# Patient Record
Sex: FEMALE | Race: WHITE | NOT HISPANIC OR LATINO | Employment: OTHER | URBAN - METROPOLITAN AREA
[De-identification: names, ages, dates, MRNs, and addresses within clinical notes are randomized per-mention and may not be internally consistent; named-entity substitution may affect disease eponyms.]

---

## 2023-04-05 PROBLEM — R35.0 INCREASED URINARY FREQUENCY: Status: ACTIVE | Noted: 2023-04-05

## 2023-04-05 PROBLEM — R92.8 ABNORMAL MAMMOGRAM OF RIGHT BREAST: Status: ACTIVE | Noted: 2023-04-05

## 2023-04-05 PROBLEM — I10 ESSENTIAL HYPERTENSION: Status: ACTIVE | Noted: 2023-04-05

## 2023-04-05 PROBLEM — I25.2 HISTORY OF MYOCARDIAL INFARCTION: Status: ACTIVE | Noted: 2023-04-05

## 2023-04-05 PROBLEM — M25.469 EFFUSION OF JOINT, LOWER LEG: Status: ACTIVE | Noted: 2023-04-05

## 2023-04-05 PROBLEM — R73.9 HYPERGLYCEMIA: Status: ACTIVE | Noted: 2023-04-05

## 2023-04-05 PROBLEM — Z86.39 HISTORY OF MIXED HYPERLIPIDEMIA: Status: ACTIVE | Noted: 2023-04-05

## 2023-04-05 PROBLEM — N39.0 URINARY TRACT INFECTION, ACUTE: Status: ACTIVE | Noted: 2023-04-05

## 2023-04-05 PROBLEM — F41.9 ANXIETY: Status: ACTIVE | Noted: 2023-04-05

## 2023-04-05 PROBLEM — S83.249A ACUTE MEDIAL MENISCAL TEAR: Status: ACTIVE | Noted: 2023-04-05

## 2023-04-05 PROBLEM — K21.9 GERD (GASTROESOPHAGEAL REFLUX DISEASE): Status: ACTIVE | Noted: 2023-04-05

## 2023-04-05 PROBLEM — R06.00 DYSPNEA: Status: ACTIVE | Noted: 2023-04-05

## 2023-04-05 PROBLEM — N63.11 BREAST LUMP ON RIGHT SIDE AT 10 O'CLOCK POSITION: Status: ACTIVE | Noted: 2023-04-05

## 2023-04-05 PROBLEM — M23.90 INTERNAL DERANGEMENT OF KNEE: Status: ACTIVE | Noted: 2023-04-05

## 2023-04-05 PROBLEM — N64.59 ABNORMAL BREAST FINDING: Status: ACTIVE | Noted: 2023-04-05

## 2023-04-05 PROBLEM — Z95.5 HISTORY OF HEART ARTERY STENT: Status: ACTIVE | Noted: 2023-04-05

## 2023-04-05 PROBLEM — I25.10 CORONARY ARTERIOSCLEROSIS: Status: ACTIVE | Noted: 2023-04-05

## 2023-04-05 PROBLEM — M25.562 LEFT KNEE PAIN: Status: ACTIVE | Noted: 2023-04-05

## 2023-04-05 PROBLEM — R22.1 LOCALIZED SWELLING, MASS AND LUMP, NECK: Status: ACTIVE | Noted: 2023-04-05

## 2023-04-05 PROBLEM — E78.5 HYPERLIPIDEMIA: Status: ACTIVE | Noted: 2023-04-05

## 2023-04-05 RX ORDER — LORAZEPAM 1 MG/1
TABLET ORAL
COMMUNITY
Start: 2014-05-08 | End: 2023-04-11 | Stop reason: SDUPTHER

## 2023-04-05 RX ORDER — ACETAMINOPHEN, DIPHENHYDRAMINE HCL, PHENYLEPHRINE HCL 325; 25; 5 MG/1; MG/1; MG/1
TABLET ORAL
COMMUNITY
Start: 2020-12-10

## 2023-04-05 RX ORDER — ATORVASTATIN CALCIUM 40 MG/1
1 TABLET, FILM COATED ORAL DAILY
COMMUNITY
Start: 2017-10-18 | End: 2024-04-25

## 2023-04-05 RX ORDER — METOPROLOL SUCCINATE 50 MG/1
1 TABLET, EXTENDED RELEASE ORAL DAILY
COMMUNITY
Start: 2015-03-30 | End: 2023-06-01

## 2023-04-11 ENCOUNTER — OFFICE VISIT (OUTPATIENT)
Dept: PRIMARY CARE | Facility: CLINIC | Age: 78
End: 2023-04-11
Payer: MEDICARE

## 2023-04-11 ENCOUNTER — LAB (OUTPATIENT)
Dept: LAB | Facility: LAB | Age: 78
End: 2023-04-11
Payer: MEDICARE

## 2023-04-11 VITALS
WEIGHT: 173 LBS | SYSTOLIC BLOOD PRESSURE: 120 MMHG | BODY MASS INDEX: 27.8 KG/M2 | DIASTOLIC BLOOD PRESSURE: 80 MMHG | HEIGHT: 66 IN

## 2023-04-11 DIAGNOSIS — F41.9 ANXIETY: ICD-10-CM

## 2023-04-11 DIAGNOSIS — Z79.899 BENZODIAZEPINE CONTRACT EXISTS: ICD-10-CM

## 2023-04-11 DIAGNOSIS — F41.9 ANXIETY: Primary | ICD-10-CM

## 2023-04-11 PROCEDURE — 80354 DRUG SCREENING FENTANYL: CPT

## 2023-04-11 PROCEDURE — 80307 DRUG TEST PRSMV CHEM ANLYZR: CPT

## 2023-04-11 PROCEDURE — 1159F MED LIST DOCD IN RCRD: CPT | Performed by: STUDENT IN AN ORGANIZED HEALTH CARE EDUCATION/TRAINING PROGRAM

## 2023-04-11 PROCEDURE — 80368 SEDATIVE HYPNOTICS: CPT

## 2023-04-11 PROCEDURE — 80365 DRUG SCREENING OXYCODONE: CPT

## 2023-04-11 PROCEDURE — 3079F DIAST BP 80-89 MM HG: CPT | Performed by: STUDENT IN AN ORGANIZED HEALTH CARE EDUCATION/TRAINING PROGRAM

## 2023-04-11 PROCEDURE — 3074F SYST BP LT 130 MM HG: CPT | Performed by: STUDENT IN AN ORGANIZED HEALTH CARE EDUCATION/TRAINING PROGRAM

## 2023-04-11 PROCEDURE — 80358 DRUG SCREENING METHADONE: CPT

## 2023-04-11 PROCEDURE — 80346 BENZODIAZEPINES1-12: CPT

## 2023-04-11 PROCEDURE — 80373 DRUG SCREENING TRAMADOL: CPT

## 2023-04-11 PROCEDURE — 99213 OFFICE O/P EST LOW 20 MIN: CPT | Performed by: STUDENT IN AN ORGANIZED HEALTH CARE EDUCATION/TRAINING PROGRAM

## 2023-04-11 PROCEDURE — 80361 OPIATES 1 OR MORE: CPT

## 2023-04-11 PROCEDURE — 1036F TOBACCO NON-USER: CPT | Performed by: STUDENT IN AN ORGANIZED HEALTH CARE EDUCATION/TRAINING PROGRAM

## 2023-04-11 RX ORDER — VIT C/E/ZN/COPPR/LUTEIN/ZEAXAN 250MG-90MG
2000 CAPSULE ORAL
COMMUNITY
Start: 2014-09-10 | End: 2023-04-11 | Stop reason: ALTCHOICE

## 2023-04-11 RX ORDER — LORAZEPAM 1 MG/1
1 TABLET ORAL EVERY 8 HOURS PRN
Qty: 270 TABLET | Refills: 0 | Status: SHIPPED | OUTPATIENT
Start: 2023-04-11 | End: 2023-07-11 | Stop reason: SDUPTHER

## 2023-04-11 ASSESSMENT — ENCOUNTER SYMPTOMS
LOSS OF SENSATION IN FEET: 0
OCCASIONAL FEELINGS OF UNSTEADINESS: 0
DYSURIA: 0
NERVOUS/ANXIOUS: 0
EYE DISCHARGE: 0
BRUISES/BLEEDS EASILY: 0
MYALGIAS: 0
SHORTNESS OF BREATH: 0
UNEXPECTED WEIGHT CHANGE: 0
ABDOMINAL PAIN: 0
DEPRESSION: 1

## 2023-04-11 NOTE — PROGRESS NOTES
"Follow up and med refill    Subjective   Patient ID: Jasmine Price is a 78 y.o. female who presents for Follow-up and Med Refill.    Med Refill  Pertinent negatives include no abdominal pain, chest pain, congestion, myalgias or rash.    presents for follow-up medication refill.  Overall has been doing well.  There are some days where the lorazepam is used twice a day and some days where he is 3 times a day.  Friend recently learned that her son had passed away in [place] which has caused an increase in anxiety    Review of Systems   Constitutional:  Negative for unexpected weight change.   HENT:  Negative for congestion.    Eyes:  Negative for discharge.   Respiratory:  Negative for shortness of breath.    Cardiovascular:  Negative for chest pain.   Gastrointestinal:  Negative for abdominal pain.   Endocrine: Negative for polyuria.   Genitourinary:  Negative for dysuria.   Musculoskeletal:  Negative for myalgias.   Skin:  Negative for rash.   Allergic/Immunologic: Negative for immunocompromised state.   Neurological:  Negative for syncope.   Hematological:  Does not bruise/bleed easily.   Psychiatric/Behavioral:  The patient is not nervous/anxious.          Objective   /80   Ht 1.676 m (5' 6\")   Wt 78.5 kg (173 lb)   BMI 27.92 kg/m²     Physical Exam  Constitutional:       General: She is not in acute distress.  Eyes:      Extraocular Movements: Extraocular movements intact.   Cardiovascular:      Rate and Rhythm: Normal rate and regular rhythm.      Heart sounds: No murmur heard.  Pulmonary:      Breath sounds: No wheezing.   Abdominal:      Palpations: Abdomen is soft.      Tenderness: There is no abdominal tenderness.   Musculoskeletal:         General: No swelling.   Skin:     Findings: No rash.   Psychiatric:         Mood and Affect: Mood normal.           Assessment/Plan       Anxiety  -Continue lorazepam 1 mg to 3 times daily as needed, at times will take twice a day, has been going through a " state since the passing away of her  at the beginning of 2023.  Did discuss weaning back to twice a day in the future  -Discussed side effect profile states that she is not experiencing any  -OARRS reviewed, no concerning behavior  -Drug contract 4/11/2023, urine screen ordered     CAD/DLD/HTN  -Follows with cardiology   -Continue aspirin and statin  -Continue metoprolol     Bladder prolapse  -Follows with gynecology     Health maintenance  -Signed DNAR-[facility] paperwork, has healthcare power of  designated   -Has received both Covid vaccines and booster  -Mammogram 2021 through [facility], new requisition given today  -Pneumovax 2014  -Received Shingrix  -Tdap 2021      RTC 3 months     This note was dictated by speech recognition. Minor errors in transcription may be present.

## 2023-04-14 LAB
6-ACETYLMORPHINE: <25 NG/ML
7-AMINOCLONAZEPAM: <25 NG/ML
ALPHA-HYDROXYALPRAZOLAM: <25 NG/ML
ALPHA-HYDROXYMIDAZOLAM: <25 NG/ML
ALPRAZOLAM: <25 NG/ML
AMPHETAMINE (PRESENCE) IN URINE BY SCREEN METHOD: ABNORMAL
BARBITURATES PRESENCE IN URINE BY SCREEN METHOD: ABNORMAL
CANNABINOIDS IN URINE BY SCREEN METHOD: ABNORMAL
CHLORDIAZEPOXIDE: <25 NG/ML
CLONAZEPAM: <25 NG/ML
COCAINE (PRESENCE) IN URINE BY SCREEN METHOD: ABNORMAL
CODEINE: <50 NG/ML
CREATINE, URINE FOR DRUG: 51.9 MG/DL
DIAZEPAM: <25 NG/ML
DRUG SCREEN COMMENT URINE: ABNORMAL
EDDP: <25 NG/ML
FENTANYL CONFIRMATION, URINE: <2.5 NG/ML
HYDROCODONE: <25 NG/ML
HYDROMORPHONE: <25 NG/ML
LORAZEPAM: >1000 NG/ML
METHADONE CONFIRMATION,URINE: <25 NG/ML
MIDAZOLAM: <25 NG/ML
MORPHINE URINE: <50 NG/ML
NORDIAZEPAM: <25 NG/ML
NORFENTANYL: <2.5 NG/ML
NORHYDROCODONE: <25 NG/ML
NOROXYCODONE: <25 NG/ML
O-DESMETHYLTRAMADOL: <50 NG/ML
OXAZEPAM: <25 NG/ML
OXYCODONE: <25 NG/ML
OXYMORPHONE: <25 NG/ML
PHENCYCLIDINE (PRESENCE) IN URINE BY SCREEN METHOD: ABNORMAL
TEMAZEPAM: <25 NG/ML
TRAMADOL: <50 NG/ML
ZOLPIDEM METABOLITE (ZCA): <25 NG/ML
ZOLPIDEM: <25 NG/ML

## 2023-06-01 DIAGNOSIS — I10 ESSENTIAL (PRIMARY) HYPERTENSION: ICD-10-CM

## 2023-06-01 RX ORDER — METOPROLOL SUCCINATE 50 MG/1
TABLET, EXTENDED RELEASE ORAL
Qty: 90 TABLET | Refills: 0 | Status: SHIPPED | OUTPATIENT
Start: 2023-06-01 | End: 2023-08-28

## 2023-07-10 PROBLEM — M77.40 METATARSALGIA: Status: ACTIVE | Noted: 2023-07-10

## 2023-07-10 PROBLEM — M25.569 KNEE PAIN: Status: ACTIVE | Noted: 2023-07-10

## 2023-07-10 PROBLEM — M84.376A STRESS REACTION OF FOOT: Status: ACTIVE | Noted: 2023-07-10

## 2023-07-10 PROBLEM — E28.39 MENOPAUSE OVARIAN FAILURE: Status: ACTIVE | Noted: 2023-07-10

## 2023-07-10 PROBLEM — M79.672 LEFT FOOT PAIN: Status: ACTIVE | Noted: 2023-07-10

## 2023-07-11 ENCOUNTER — OFFICE VISIT (OUTPATIENT)
Dept: PRIMARY CARE | Facility: CLINIC | Age: 78
End: 2023-07-11
Payer: MEDICARE

## 2023-07-11 VITALS — BODY MASS INDEX: 27.6 KG/M2 | DIASTOLIC BLOOD PRESSURE: 84 MMHG | SYSTOLIC BLOOD PRESSURE: 122 MMHG | WEIGHT: 171 LBS

## 2023-07-11 DIAGNOSIS — F41.9 ANXIETY: ICD-10-CM

## 2023-07-11 PROCEDURE — 99213 OFFICE O/P EST LOW 20 MIN: CPT | Performed by: STUDENT IN AN ORGANIZED HEALTH CARE EDUCATION/TRAINING PROGRAM

## 2023-07-11 PROCEDURE — 3079F DIAST BP 80-89 MM HG: CPT | Performed by: STUDENT IN AN ORGANIZED HEALTH CARE EDUCATION/TRAINING PROGRAM

## 2023-07-11 PROCEDURE — 1036F TOBACCO NON-USER: CPT | Performed by: STUDENT IN AN ORGANIZED HEALTH CARE EDUCATION/TRAINING PROGRAM

## 2023-07-11 PROCEDURE — 1160F RVW MEDS BY RX/DR IN RCRD: CPT | Performed by: STUDENT IN AN ORGANIZED HEALTH CARE EDUCATION/TRAINING PROGRAM

## 2023-07-11 PROCEDURE — 3074F SYST BP LT 130 MM HG: CPT | Performed by: STUDENT IN AN ORGANIZED HEALTH CARE EDUCATION/TRAINING PROGRAM

## 2023-07-11 PROCEDURE — 1159F MED LIST DOCD IN RCRD: CPT | Performed by: STUDENT IN AN ORGANIZED HEALTH CARE EDUCATION/TRAINING PROGRAM

## 2023-07-11 RX ORDER — LORAZEPAM 1 MG/1
1 TABLET ORAL EVERY 8 HOURS PRN
Qty: 270 TABLET | Refills: 0 | Status: SHIPPED | OUTPATIENT
Start: 2023-07-11 | End: 2023-10-10 | Stop reason: SDUPTHER

## 2023-07-11 RX ORDER — VIT C/E/ZN/COPPR/LUTEIN/ZEAXAN 250MG-90MG
2000 CAPSULE ORAL
COMMUNITY
Start: 2014-09-10 | End: 2023-07-11 | Stop reason: ALTCHOICE

## 2023-07-11 NOTE — PROGRESS NOTES
Follow up and med refill    Subjective   Patient ID: Jasmine Prcie is a 78 y.o. female who presents for Follow-up and Med Refill.    HPI presents for follow-up medication refill.  Reports has been doing well    Review of Systems  8 point review of systems is otherwise negative unless mentioned on HPI      Objective   /84   Wt 77.6 kg (171 lb)   BMI 27.60 kg/m²     Physical Exam  General: No acute distress  HEENT: EOMI  CV: Regular rate and rhythm, normal S1 and S2, no murmurs  Pulm: Clear to auscultation bilaterally, no wheezings, rales or rhonchi  Abd: Nondistended  MSK: 5/5 strength in all extremities  Skin: No rashes   Lymphatic: No lymphadenopathy      Assessment/Plan       Anxiety  -Continue lorazepam 1 mg to 3 times daily as needed, at times will take twice a day, has been going through a state since the passing away of her  at the beginning of 2023.  Did discuss weaning back to twice a day in the future  -Discussed side effect profile states that she is not experiencing any  -OARRS reviewed, no concerning behavior  -Drug contract and urine screen 4/11/2023     CAD/DLD/HTN  -Follows with cardiology   -Continue aspirin and statin  -Continue metoprolol  -Stress test 11/2022 that was normal      Bladder prolapse  -Follows with gynecology     Health maintenance  -Signed DNAR-[facility] paperwork, has healthcare power of  designated   -Has received both Covid vaccines and booster  -Mammogram 2021 through [facility], new requisition given today  -Pneumovax 2014  -Received Shingrix  -Tdap 2021      RTC 3 months     This note was dictated by speech recognition. Minor errors in transcription may be present.

## 2023-08-28 DIAGNOSIS — I10 ESSENTIAL (PRIMARY) HYPERTENSION: ICD-10-CM

## 2023-08-28 RX ORDER — METOPROLOL SUCCINATE 50 MG/1
TABLET, EXTENDED RELEASE ORAL
Qty: 90 TABLET | Refills: 0 | Status: SHIPPED | OUTPATIENT
Start: 2023-08-28 | End: 2023-11-21

## 2023-10-10 ENCOUNTER — OFFICE VISIT (OUTPATIENT)
Dept: PRIMARY CARE | Facility: CLINIC | Age: 78
End: 2023-10-10
Payer: MEDICARE

## 2023-10-10 VITALS — DIASTOLIC BLOOD PRESSURE: 90 MMHG | BODY MASS INDEX: 27.28 KG/M2 | SYSTOLIC BLOOD PRESSURE: 122 MMHG | WEIGHT: 169 LBS

## 2023-10-10 DIAGNOSIS — F41.9 ANXIETY: ICD-10-CM

## 2023-10-10 PROBLEM — I25.10 CORONARY ARTERIOSCLEROSIS: Status: RESOLVED | Noted: 2023-04-05 | Resolved: 2023-10-10

## 2023-10-10 PROCEDURE — 1160F RVW MEDS BY RX/DR IN RCRD: CPT | Performed by: STUDENT IN AN ORGANIZED HEALTH CARE EDUCATION/TRAINING PROGRAM

## 2023-10-10 PROCEDURE — 1036F TOBACCO NON-USER: CPT | Performed by: STUDENT IN AN ORGANIZED HEALTH CARE EDUCATION/TRAINING PROGRAM

## 2023-10-10 PROCEDURE — 3074F SYST BP LT 130 MM HG: CPT | Performed by: STUDENT IN AN ORGANIZED HEALTH CARE EDUCATION/TRAINING PROGRAM

## 2023-10-10 PROCEDURE — 3080F DIAST BP >= 90 MM HG: CPT | Performed by: STUDENT IN AN ORGANIZED HEALTH CARE EDUCATION/TRAINING PROGRAM

## 2023-10-10 PROCEDURE — 99213 OFFICE O/P EST LOW 20 MIN: CPT | Performed by: STUDENT IN AN ORGANIZED HEALTH CARE EDUCATION/TRAINING PROGRAM

## 2023-10-10 PROCEDURE — 1159F MED LIST DOCD IN RCRD: CPT | Performed by: STUDENT IN AN ORGANIZED HEALTH CARE EDUCATION/TRAINING PROGRAM

## 2023-10-10 RX ORDER — LORAZEPAM 1 MG/1
1 TABLET ORAL EVERY 8 HOURS PRN
Qty: 270 TABLET | Refills: 0 | Status: SHIPPED | OUTPATIENT
Start: 2023-10-10 | End: 2024-01-16 | Stop reason: SDUPTHER

## 2023-10-10 NOTE — PROGRESS NOTES
Follow up and med refill    Subjective   Patient ID: Jasmine Price is a 78 y.o. female who presents for Follow-up and Med Refill.    HPI     Presents for follow-up medication refill.  Reports has been doing well.  Occasionally has some shortness of breath with exertion.  Has not changed recently.  Had gone through a recent increase in stress with the family number that had lost their  job.    Review of Systems    8 point review of systems is otherwise negative unless mentioned on HPI      Objective   /90   Wt 76.7 kg (169 lb)   BMI 27.28 kg/m²     Physical Exam    General: No acute distress  HEENT: EOMI  CV: Regular rate and rhythm, normal S1 and S2, no murmurs  Pulm: Clear to auscultation bilaterally, no wheezings, rales or rhonchi  Abd: Nondistended  MSK: 5/5 strength in all extremities  Skin: No rashes   Lymphatic: No lymphadenopathy      Assessment/Plan       Anxiety  -Continue lorazepam 1 mg to 3 times daily as needed, at times will take twice a day, has been going through a state since the passing away of her  at the beginning of 2023.  Did discuss weaning back to twice a day in the future  -Discussed side effect profile states that she is not experiencing any  -OARRS reviewed, no concerning behavior  -Drug contract and urine screen 4/11/2023     CAD/DLD/HTN  -Follows with cardiology   -Continue aspirin and statin  -Continue metoprolol  -Stress test 11/2022 that was normal      Bladder prolapse  -Follows with gynecology     Health maintenance  -Signed [facility] paperwork, has healthcare power of  designated   -Has received both Covid vaccines and booster  -Mammogram 9/2023  -Pneumovax 2014  -Received Shingrix  -Tdap 2021      RTC 3 months     This note was dictated by speech recognition. Minor errors in transcription may be present.        Patient was identified as a fall risk. Risk prevention instructions provided.

## 2023-10-10 NOTE — PATIENT INSTRUCTIONS

## 2023-11-21 DIAGNOSIS — I10 ESSENTIAL (PRIMARY) HYPERTENSION: ICD-10-CM

## 2023-11-21 RX ORDER — METOPROLOL SUCCINATE 50 MG/1
TABLET, EXTENDED RELEASE ORAL
Qty: 90 TABLET | Refills: 0 | Status: SHIPPED | OUTPATIENT
Start: 2023-11-21 | End: 2024-02-14

## 2024-01-16 ENCOUNTER — OFFICE VISIT (OUTPATIENT)
Dept: PRIMARY CARE | Facility: CLINIC | Age: 79
End: 2024-01-16
Payer: MEDICARE

## 2024-01-16 VITALS — WEIGHT: 175 LBS | DIASTOLIC BLOOD PRESSURE: 80 MMHG | SYSTOLIC BLOOD PRESSURE: 120 MMHG | BODY MASS INDEX: 28.25 KG/M2

## 2024-01-16 DIAGNOSIS — Z00.00 MEDICARE ANNUAL WELLNESS VISIT, SUBSEQUENT: ICD-10-CM

## 2024-01-16 DIAGNOSIS — Z00.00 ROUTINE GENERAL MEDICAL EXAMINATION AT HEALTH CARE FACILITY: ICD-10-CM

## 2024-01-16 DIAGNOSIS — Z12.12 SCREENING FOR COLORECTAL CANCER: ICD-10-CM

## 2024-01-16 DIAGNOSIS — F41.9 ANXIETY: ICD-10-CM

## 2024-01-16 DIAGNOSIS — Z00.00 HEALTHCARE MAINTENANCE: Primary | ICD-10-CM

## 2024-01-16 DIAGNOSIS — Z12.11 SCREENING FOR COLORECTAL CANCER: ICD-10-CM

## 2024-01-16 PROCEDURE — 1036F TOBACCO NON-USER: CPT | Performed by: STUDENT IN AN ORGANIZED HEALTH CARE EDUCATION/TRAINING PROGRAM

## 2024-01-16 PROCEDURE — 3079F DIAST BP 80-89 MM HG: CPT | Performed by: STUDENT IN AN ORGANIZED HEALTH CARE EDUCATION/TRAINING PROGRAM

## 2024-01-16 PROCEDURE — 1170F FXNL STATUS ASSESSED: CPT | Performed by: STUDENT IN AN ORGANIZED HEALTH CARE EDUCATION/TRAINING PROGRAM

## 2024-01-16 PROCEDURE — 99213 OFFICE O/P EST LOW 20 MIN: CPT | Performed by: STUDENT IN AN ORGANIZED HEALTH CARE EDUCATION/TRAINING PROGRAM

## 2024-01-16 PROCEDURE — 1159F MED LIST DOCD IN RCRD: CPT | Performed by: STUDENT IN AN ORGANIZED HEALTH CARE EDUCATION/TRAINING PROGRAM

## 2024-01-16 PROCEDURE — 1160F RVW MEDS BY RX/DR IN RCRD: CPT | Performed by: STUDENT IN AN ORGANIZED HEALTH CARE EDUCATION/TRAINING PROGRAM

## 2024-01-16 PROCEDURE — 3074F SYST BP LT 130 MM HG: CPT | Performed by: STUDENT IN AN ORGANIZED HEALTH CARE EDUCATION/TRAINING PROGRAM

## 2024-01-16 PROCEDURE — G0439 PPPS, SUBSEQ VISIT: HCPCS | Performed by: STUDENT IN AN ORGANIZED HEALTH CARE EDUCATION/TRAINING PROGRAM

## 2024-01-16 RX ORDER — LORAZEPAM 1 MG/1
1 TABLET ORAL EVERY 8 HOURS PRN
Qty: 270 TABLET | Refills: 0 | Status: SHIPPED | OUTPATIENT
Start: 2024-01-16 | End: 2024-04-09 | Stop reason: SDUPTHER

## 2024-01-16 ASSESSMENT — PATIENT HEALTH QUESTIONNAIRE - PHQ9
SUM OF ALL RESPONSES TO PHQ9 QUESTIONS 1 AND 2: 0
1. LITTLE INTEREST OR PLEASURE IN DOING THINGS: NOT AT ALL
2. FEELING DOWN, DEPRESSED OR HOPELESS: NOT AT ALL

## 2024-01-16 ASSESSMENT — ACTIVITIES OF DAILY LIVING (ADL)
MANAGING_FINANCES: INDEPENDENT
DRESSING: INDEPENDENT
DOING_HOUSEWORK: INDEPENDENT
BATHING: INDEPENDENT
TAKING_MEDICATION: INDEPENDENT
GROCERY_SHOPPING: INDEPENDENT

## 2024-01-16 NOTE — PROGRESS NOTES
Subjective   Reason for Visit: Jasmine Price is an 78 y.o. female here for a Medicare Wellness visit.          Reviewed all medications by prescribing practitioner or clinical pharmacist (such as prescriptions, OTCs, herbal therapies and supplements) and documented in the medical record.    HPI    Patient Care Team:  Darryl Stout MD as PCP - General  Fili White DO as PCP - [employer]age PCP     Review of Systems    Objective   Vitals:  /80   Wt 79.4 kg (175 lb)   BMI 28.25 kg/m²       Physical Exam    Assessment/Plan   Problem List Items Addressed This Visit        Mental Health    Anxiety    Relevant Medications    LORazepam (Ativan) 1 mg tablet   Other Visit Diagnoses     Healthcare maintenance    -  Primary    Relevant Orders    CBC    Comprehensive Metabolic Panel    Lipid Panel    TSH with reflex to Free T4 if abnormal    Screening for colorectal cancer        Relevant Orders    CBC    Routine general medical examination at health care facility                  intact

## 2024-01-16 NOTE — PROGRESS NOTES
Subjective   Patient ID: Jasmine Price is a 78 y.o. female who presents for Follow-up, Med Refill, and Medicare Annual Wellness Visit Subsequent.  HPI  Pt is here for follow up and medication refill. She reports her shortness of breath on exertion has improved and feels well otherwise. Denies any new complaints.    Review of Systems  12-point ROS was reviewed and is negative, unless otherwise noted in HPI    Objective   Vitals:    01/16/24 1142   BP: 120/80      Physical Exam  GEN: alert, conversant, NAD  HEENT: PERRL, EOMI, MMM  NECK: supple, no LAD appreciated  CHEST: CTAB  CV: S1, S2, RRR, no murmurs appreciated  ABD: soft, NT, ND  EXT: no significant LE edema  SKIN: warm, dry    Assessment/Plan   Anxiety  -Continue lorazepam 1 mg to 3 times daily as needed, at times will take twice a day. Did discuss weaning back to twice a day in the future  -Discussed side effect profile states that she is not experiencing any  -OARRS reviewed, no concerning behavior  -Drug contract and urine screen 4/11/2023     CAD/DLD/HTN  -Follows with cardiology   -Continue aspirin and statin  -Continue metoprolol  -Stress test 11/2022 that was normal      Bladder prolapse  -Follows with gynecology     Health maintenance  -Signed DNAR-[facility] paperwork, has healthcare power of  designated   -Has received both Covid vaccines and booster  -Mammogram 9/2023  -Pneumovax 2014  -Received Shingrix  -Tdap 2021   -Labs ordered today  -Annual wellness today     RTC 3 months     This note was dictated by speech recognition. Minor errors in transcription may be present.       Lex Tovar,

## 2024-01-16 NOTE — PROGRESS NOTES
Follow up and med refill and wellness visit    Subjective   Reason for Visit: Jasmine Price is an 78 y.o. female here for a Medicare Wellness visit.          Reviewed all medications by prescribing practitioner or clinical pharmacist (such as prescriptions, OTCs, herbal therapies and supplements) and documented in the medical record.    HPI    Patient Care Team:  Darryl Stout MD as PCP - General  Fili White DO as PCP - [employer]vantage PCP     Review of Systems    Objective   Vitals:  /80   Wt 79.4 kg (175 lb)   BMI 28.25 kg/m²       Physical Exam    Assessment/Plan   Problem List Items Addressed This Visit       Anxiety

## 2024-01-25 ENCOUNTER — LAB (OUTPATIENT)
Dept: LAB | Facility: LAB | Age: 79
End: 2024-01-25
Payer: MEDICARE

## 2024-01-25 DIAGNOSIS — Z00.00 HEALTHCARE MAINTENANCE: ICD-10-CM

## 2024-01-25 DIAGNOSIS — Z12.11 SCREENING FOR COLORECTAL CANCER: ICD-10-CM

## 2024-01-25 DIAGNOSIS — Z12.12 SCREENING FOR COLORECTAL CANCER: ICD-10-CM

## 2024-01-25 LAB
ALBUMIN SERPL BCP-MCNC: 4 G/DL (ref 3.4–5)
ALP SERPL-CCNC: 62 U/L (ref 33–136)
ALT SERPL W P-5'-P-CCNC: 13 U/L (ref 7–45)
ANION GAP SERPL CALC-SCNC: 11 MMOL/L (ref 10–20)
AST SERPL W P-5'-P-CCNC: 18 U/L (ref 9–39)
BILIRUB SERPL-MCNC: 0.6 MG/DL (ref 0–1.2)
BUN SERPL-MCNC: 23 MG/DL (ref 6–23)
CALCIUM SERPL-MCNC: 8.7 MG/DL (ref 8.6–10.3)
CHLORIDE SERPL-SCNC: 105 MMOL/L (ref 98–107)
CHOLEST SERPL-MCNC: 128 MG/DL (ref 0–199)
CHOLESTEROL/HDL RATIO: 2.7
CO2 SERPL-SCNC: 28 MMOL/L (ref 21–32)
CREAT SERPL-MCNC: 0.83 MG/DL (ref 0.5–1.05)
EGFRCR SERPLBLD CKD-EPI 2021: 72 ML/MIN/1.73M*2
ERYTHROCYTE [DISTWIDTH] IN BLOOD BY AUTOMATED COUNT: 13.4 % (ref 11.5–14.5)
GLUCOSE SERPL-MCNC: 107 MG/DL (ref 74–99)
HCT VFR BLD AUTO: 44 % (ref 36–46)
HDLC SERPL-MCNC: 47 MG/DL
HGB BLD-MCNC: 13.9 G/DL (ref 12–16)
LDLC SERPL CALC-MCNC: 50 MG/DL
MCH RBC QN AUTO: 30.8 PG (ref 26–34)
MCHC RBC AUTO-ENTMCNC: 31.6 G/DL (ref 32–36)
MCV RBC AUTO: 98 FL (ref 80–100)
NON HDL CHOLESTEROL: 81 MG/DL (ref 0–149)
NRBC BLD-RTO: 0 /100 WBCS (ref 0–0)
PLATELET # BLD AUTO: 224 X10*3/UL (ref 150–450)
POTASSIUM SERPL-SCNC: 4.7 MMOL/L (ref 3.5–5.3)
PROT SERPL-MCNC: 6.9 G/DL (ref 6.4–8.2)
RBC # BLD AUTO: 4.51 X10*6/UL (ref 4–5.2)
SODIUM SERPL-SCNC: 139 MMOL/L (ref 136–145)
TRIGL SERPL-MCNC: 154 MG/DL (ref 0–149)
TSH SERPL-ACNC: 2.77 MIU/L (ref 0.44–3.98)
VLDL: 31 MG/DL (ref 0–40)
WBC # BLD AUTO: 7.3 X10*3/UL (ref 4.4–11.3)

## 2024-01-25 PROCEDURE — 36415 COLL VENOUS BLD VENIPUNCTURE: CPT

## 2024-01-25 PROCEDURE — 84443 ASSAY THYROID STIM HORMONE: CPT

## 2024-01-25 PROCEDURE — 85027 COMPLETE CBC AUTOMATED: CPT

## 2024-01-25 PROCEDURE — 80061 LIPID PANEL: CPT

## 2024-01-25 PROCEDURE — 80053 COMPREHEN METABOLIC PANEL: CPT

## 2024-02-14 DIAGNOSIS — I10 ESSENTIAL (PRIMARY) HYPERTENSION: ICD-10-CM

## 2024-02-14 RX ORDER — METOPROLOL SUCCINATE 50 MG/1
TABLET, EXTENDED RELEASE ORAL
Qty: 90 TABLET | Refills: 1 | Status: SHIPPED | OUTPATIENT
Start: 2024-02-14

## 2024-04-04 PROBLEM — M25.40 EFFUSION OF JOINT: Status: ACTIVE | Noted: 2023-04-05

## 2024-04-04 PROBLEM — I10 PRIMARY HYPERTENSION: Status: ACTIVE | Noted: 2023-04-05

## 2024-04-04 PROBLEM — Z98.890 HISTORY OF COLONOSCOPY: Status: ACTIVE | Noted: 2024-04-04

## 2024-04-04 PROBLEM — E55.9 VITAMIN D DEFICIENCY: Status: ACTIVE | Noted: 2024-04-04

## 2024-04-04 PROBLEM — R06.02 SHORTNESS OF BREATH: Status: ACTIVE | Noted: 2023-04-05

## 2024-04-04 PROBLEM — E28.39 OVARIAN FAILURE DUE TO MENOPAUSE: Status: ACTIVE | Noted: 2023-07-10

## 2024-04-04 PROBLEM — Z86.39 HISTORY OF ELEVATED LIPIDS: Status: ACTIVE | Noted: 2023-04-05

## 2024-04-04 PROBLEM — R68.89 FINDING OF NECK REGION: Status: ACTIVE | Noted: 2023-04-05

## 2024-04-04 PROBLEM — N63.0 MASS OF BREAST: Status: ACTIVE | Noted: 2023-04-05

## 2024-04-04 PROBLEM — M25.562 PAIN IN LEFT KNEE: Status: ACTIVE | Noted: 2023-04-05

## 2024-04-04 PROBLEM — R92.8 ABNORMAL MAMMOGRAM: Status: ACTIVE | Noted: 2023-04-05

## 2024-04-04 PROBLEM — N39.0 ACUTE URINARY TRACT INFECTION: Status: ACTIVE | Noted: 2023-04-05

## 2024-04-04 PROBLEM — R35.0 INCREASED FREQUENCY OF URINATION: Status: ACTIVE | Noted: 2023-04-05

## 2024-04-04 PROBLEM — S92.909A FRACTURE OF FOOT: Status: ACTIVE | Noted: 2023-07-10

## 2024-04-04 PROBLEM — M23.90 DERANGEMENT OF KNEE: Status: ACTIVE | Noted: 2023-04-05

## 2024-04-04 PROBLEM — K21.9 GASTROESOPHAGEAL REFLUX DISEASE: Status: ACTIVE | Noted: 2023-04-05

## 2024-04-09 ENCOUNTER — OFFICE VISIT (OUTPATIENT)
Dept: PRIMARY CARE | Facility: CLINIC | Age: 79
End: 2024-04-09
Payer: MEDICARE

## 2024-04-09 VITALS
WEIGHT: 173 LBS | BODY MASS INDEX: 27.8 KG/M2 | SYSTOLIC BLOOD PRESSURE: 125 MMHG | HEIGHT: 66 IN | DIASTOLIC BLOOD PRESSURE: 70 MMHG

## 2024-04-09 DIAGNOSIS — F41.9 ANXIETY: ICD-10-CM

## 2024-04-09 PROCEDURE — 3078F DIAST BP <80 MM HG: CPT | Performed by: STUDENT IN AN ORGANIZED HEALTH CARE EDUCATION/TRAINING PROGRAM

## 2024-04-09 PROCEDURE — 3074F SYST BP LT 130 MM HG: CPT | Performed by: STUDENT IN AN ORGANIZED HEALTH CARE EDUCATION/TRAINING PROGRAM

## 2024-04-09 PROCEDURE — 1159F MED LIST DOCD IN RCRD: CPT | Performed by: STUDENT IN AN ORGANIZED HEALTH CARE EDUCATION/TRAINING PROGRAM

## 2024-04-09 PROCEDURE — 1036F TOBACCO NON-USER: CPT | Performed by: STUDENT IN AN ORGANIZED HEALTH CARE EDUCATION/TRAINING PROGRAM

## 2024-04-09 PROCEDURE — 99213 OFFICE O/P EST LOW 20 MIN: CPT | Performed by: STUDENT IN AN ORGANIZED HEALTH CARE EDUCATION/TRAINING PROGRAM

## 2024-04-09 RX ORDER — LORAZEPAM 1 MG/1
1 TABLET ORAL EVERY 8 HOURS PRN
Qty: 270 TABLET | Refills: 0 | Status: SHIPPED | OUTPATIENT
Start: 2024-04-09

## 2024-04-09 RX ORDER — PHENAZOPYRIDINE HYDROCHLORIDE 95 MG/1
95 TABLET ORAL 3 TIMES DAILY PRN
COMMUNITY

## 2024-04-09 NOTE — PROGRESS NOTES
"Subjective   Patient ID: Jasmine Price is a 79 y.o. female who presents for Follow-up (3 month follow up ), Wheezing (For about a month noticed wheezing; occasional; happens a lot of times at night when laying down; post nasal drainage also ), and Med Refill (Lorazapam ).    HPI     Presents for follow-up medication refill.  Has been doing well.  Over about the last month has noticed a little bit of wheezing at night when laying down.  Had a recent cold.  Not overly bothered by the wheezing at this time.  Attributes some of it to postnasal drip.    Review of Systems    8 point review of systems is otherwise negative unless mentioned on HPI      Objective   /70   Ht 1.676 m (5' 6\")   Wt 78.5 kg (173 lb)   BMI 27.92 kg/m²     Physical Exam    General: No acute distress  HEENT: EOMI  CV: Regular rate and rhythm, normal S1 and S2, no murmurs  Pulm: Clear to auscultation bilaterally, no wheezings, rales or rhonchi  Abd: Nondistended  MSK: 5/5 strength in all extremities  Skin: No rashes   Lymphatic: No lymphadenopathy      Assessment/Plan       Anxiety  -Continue lorazepam 1 mg to 3 times daily as needed, at times will take twice a day. Did discuss weaning back to twice a day in the future  -Discussed side effect profile states that she is not experiencing any  -OARRS reviewed, no concerning behavior  -Drug contract and urine screen 4/11/2023    Occasional wheezing at nighttime  -Discussed trial of albuterol inhaler, not overly bothersome at this time and will plan to monitor.     CAD/DLD/HTN  -Follows with cardiology   -Continue aspirin and statin  -Continue metoprolol  -Stress test 11/2022 that was normal      Bladder prolapse  -Follows with gynecology     Health maintenance  -Signed DNAR-CCA paperwork, has healthcare power of  designated   -Has received both Covid vaccines and booster  -Mammogram 9/2023  -Pneumovax 2014  -Received Shingrix  -Tdap 2021      RTC 3 months     This note was dictated by " speech recognition. Minor errors in transcription may be present.

## 2024-04-23 ENCOUNTER — HOSPITAL ENCOUNTER (OUTPATIENT)
Dept: RADIOLOGY | Facility: HOSPITAL | Age: 79
Discharge: HOME | End: 2024-04-23
Payer: MEDICARE

## 2024-04-23 ENCOUNTER — LAB (OUTPATIENT)
Dept: LAB | Facility: LAB | Age: 79
End: 2024-04-23
Payer: MEDICARE

## 2024-04-23 ENCOUNTER — OFFICE VISIT (OUTPATIENT)
Dept: CARDIOLOGY | Facility: HOSPITAL | Age: 79
End: 2024-04-23
Payer: MEDICARE

## 2024-04-23 ENCOUNTER — APPOINTMENT (OUTPATIENT)
Dept: PRIMARY CARE | Facility: CLINIC | Age: 79
End: 2024-04-23
Payer: MEDICARE

## 2024-04-23 ENCOUNTER — APPOINTMENT (OUTPATIENT)
Dept: LAB | Facility: LAB | Age: 79
End: 2024-04-23
Payer: MEDICARE

## 2024-04-23 VITALS
HEIGHT: 66 IN | BODY MASS INDEX: 27.11 KG/M2 | HEART RATE: 66 BPM | DIASTOLIC BLOOD PRESSURE: 75 MMHG | SYSTOLIC BLOOD PRESSURE: 107 MMHG | WEIGHT: 168.7 LBS

## 2024-04-23 DIAGNOSIS — R06.02 SHORTNESS OF BREATH: ICD-10-CM

## 2024-04-23 DIAGNOSIS — I10 ESSENTIAL HYPERTENSION: ICD-10-CM

## 2024-04-23 DIAGNOSIS — I25.10 CORONARY ARTERY DISEASE INVOLVING NATIVE CORONARY ARTERY OF NATIVE HEART WITHOUT ANGINA PECTORIS: Primary | ICD-10-CM

## 2024-04-23 DIAGNOSIS — E78.00 PURE HYPERCHOLESTEROLEMIA: ICD-10-CM

## 2024-04-23 DIAGNOSIS — I25.2 HISTORY OF MYOCARDIAL INFARCTION: ICD-10-CM

## 2024-04-23 LAB
ATRIAL RATE: 66 BPM
BNP SERPL-MCNC: 71 PG/ML (ref 0–99)
D DIMER PPP FEU-MCNC: 1318 NG/ML FEU
P AXIS: 70 DEGREES
P OFFSET: 216 MS
P ONSET: 154 MS
PR INTERVAL: 148 MS
Q ONSET: 228 MS
QRS COUNT: 11 BEATS
QRS DURATION: 90 MS
QT INTERVAL: 396 MS
QTC CALCULATION(BAZETT): 415 MS
QTC FREDERICIA: 409 MS
R AXIS: 47 DEGREES
T AXIS: 70 DEGREES
T OFFSET: 426 MS
VENTRICULAR RATE: 66 BPM

## 2024-04-23 PROCEDURE — 93005 ELECTROCARDIOGRAM TRACING: CPT | Performed by: INTERNAL MEDICINE

## 2024-04-23 PROCEDURE — 83880 ASSAY OF NATRIURETIC PEPTIDE: CPT

## 2024-04-23 PROCEDURE — 99214 OFFICE O/P EST MOD 30 MIN: CPT | Mod: 25 | Performed by: INTERNAL MEDICINE

## 2024-04-23 PROCEDURE — 1036F TOBACCO NON-USER: CPT | Performed by: INTERNAL MEDICINE

## 2024-04-23 PROCEDURE — 71046 X-RAY EXAM CHEST 2 VIEWS: CPT

## 2024-04-23 PROCEDURE — 99214 OFFICE O/P EST MOD 30 MIN: CPT | Performed by: INTERNAL MEDICINE

## 2024-04-23 PROCEDURE — 1159F MED LIST DOCD IN RCRD: CPT | Performed by: INTERNAL MEDICINE

## 2024-04-23 PROCEDURE — 36415 COLL VENOUS BLD VENIPUNCTURE: CPT

## 2024-04-23 PROCEDURE — 71046 X-RAY EXAM CHEST 2 VIEWS: CPT | Performed by: RADIOLOGY

## 2024-04-23 PROCEDURE — 3078F DIAST BP <80 MM HG: CPT | Performed by: INTERNAL MEDICINE

## 2024-04-23 PROCEDURE — 1160F RVW MEDS BY RX/DR IN RCRD: CPT | Performed by: INTERNAL MEDICINE

## 2024-04-23 PROCEDURE — 3074F SYST BP LT 130 MM HG: CPT | Performed by: INTERNAL MEDICINE

## 2024-04-23 PROCEDURE — 85379 FIBRIN DEGRADATION QUANT: CPT

## 2024-04-23 RX ORDER — LORATADINE 10 MG/1
10 TABLET ORAL DAILY
COMMUNITY

## 2024-04-23 ASSESSMENT — ENCOUNTER SYMPTOMS
DEPRESSION: 0
OCCASIONAL FEELINGS OF UNSTEADINESS: 0
LOSS OF SENSATION IN FEET: 0

## 2024-04-23 NOTE — PATIENT INSTRUCTIONS
Check VTE D-dimer and BNP.  Check a chest x-ray.  Decrease metoprolol succinate to 25 mg daily.  Follow-up in 3 months.

## 2024-04-23 NOTE — PROGRESS NOTES
Primary Care Physician: Darryl Stout MD  Date of Visit: 04/23/2024 11:00 AM EDT  Location of visit: [facility]     Chief Complaint:   Chief Complaint   Patient presents with    Follow-up    Shortness of Breath        HPI / Summary:   Jasmine Price is a 79 y.o. female presents for followup.  She does continue to have dyspnea on exertion when walking prolonged distances up to half a mile or when she carries things up a flight of stairs.  This has not changed significantly in the last 2 years or so.  It started during COVID when she stopped exercising and her  was sick.  She now walks up to half a mile in her neighborhood.  She will get short of breath if she has to walk up an incline or carry things up a flight of stairs.  The patient denies chest pain, palpitations, lightheadedness, syncope, orthopnea, paroxysmal nocturnal dyspnea, lower extremity edema, or bleeding problems.    She had an exercise stress test and echocardiogram in November 2022 for the same symptoms.          Past Medical History:   Diagnosis Date    Generalized anxiety disorder 04/15/2015    Generalized anxiety disorder    Other specified postprocedural states     H/O colonoscopy    Personal history of other medical treatment     H/O bone density study    Personal history of other medical treatment     History of mammogram    Vitamin D deficiency, unspecified     Vitamin D deficiency        No past surgical history on file.       Social History:   reports that she has quit smoking. Her smoking use included cigarettes. She has quit using smokeless tobacco. She reports current alcohol use. She reports that she does not use drugs.     Family History:  family history is not on file.      Allergies:  Allergies   Allergen Reactions    Iodinated Contrast Media Nausea Only, Dizziness and Unknown     Other reaction(s): Intolerance   Very Nauseated and extremely Dizzy    Patient reports this is no longer clinically significant. She has had  "contrast die since this was reported    Iodine Unknown    Nitrofurantoin Monohyd/M-Cryst Other     vomited    Bacitracin Rash       Outpatient Medications:  Current Outpatient Medications   Medication Instructions    aspirin 81 mg capsule 1 tablet, oral, Daily    atorvastatin (Lipitor) 40 mg tablet 1 tablet, oral, Daily    loratadine (CLARITIN) 10 mg, oral, Daily    LORazepam (ATIVAN) 1 mg, oral, Every 8 hours PRN    melatonin 10 mg tablet oral    metoprolol succinate XL (Toprol-XL) 50 mg 24 hr tablet TAKE 1 TABLET BY MOUTH EVERY DAY    phenazopyridine (URINARY PAIN RELIEF) 95 mg, oral, 3 times daily PRN       Physical Exam:  Vitals:    04/23/24 1105   BP: 107/75   BP Location: Left arm   Patient Position: Sitting   BP Cuff Size: Adult   Pulse: 66   Weight: 76.5 kg (168 lb 11.2 oz)   Height: 1.676 m (5' 6\")     Wt Readings from Last 5 Encounters:   04/23/24 76.5 kg (168 lb 11.2 oz)   04/09/24 78.5 kg (173 lb)   01/16/24 79.4 kg (175 lb)   10/10/23 76.7 kg (169 lb)   07/11/23 77.6 kg (171 lb)     Body mass index is 27.23 kg/m².   General: Well-developed well-nourished in no acute distress  HEENT: Normocephalic atraumatic  Neck: Supple, JVP is normal negative hepatojugular reflux 2+ carotid pulses without bruit  Pulmonary: Normal respiratory effort, clear to auscultation  Cardiovascular: No right ventricular heave, normal S1 and S2, no murmurs rubs or gallops  Abdomen: Soft nontender nondistended  Extremities: Warm without edema 2+ radial pulses bilaterally   Neurologic: Alert and oriented x3  Psychiatric: Normal mood and affect     Last Labs:  CMP:  Recent Labs     01/25/24  1020 10/27/22  0935 04/01/21  0912    142 143   K 4.7 4.5 4.9    107 105   CO2 28 31 29   ANIONGAP 11 9* 14   BUN 23 18 18   CREATININE 0.83 0.81 0.83   EGFR 72  --   --    GLUCOSE 107* 106* 109*     Recent Labs     01/25/24  1020 10/27/22  0935 04/01/21  0912   ALBUMIN 4.0 3.8 4.2   ALKPHOS 62 64 78   ALT 13 11 13   AST 18 15 17 " "  BILITOT 0.6 0.7 0.8     CBC:  Recent Labs     01/25/24  1020 10/27/22  0935 04/01/21  0912   WBC 7.3 6.8 7.5   HGB 13.9 13.1 14.3   HCT 44.0 40.7 44.3    204 222   MCV 98 95 100     COAG: No results for input(s): \"INR\", \"DDIMERVTE\" in the last 81196 hours.  HEME/ENDO:  Recent Labs     01/25/24  1020 10/27/22  0935 04/01/21  0912   TSH 2.77 2.10 3.25      CARDIAC: No results for input(s): \"LDH\", \"CKMB\", \"TROPHS\", \"BNP\" in the last 34634 hours.    No lab exists for component: \"CK\", \"CKMBP\"  Recent Labs     01/25/24  1020 10/27/22  0935 04/01/21  0912 06/19/19  0846   CHOL 128 118 141 148   LDLF  --  53 57 61   LDLCALC 50  --   --   --    HDL 47.0 44.7 50.6 57.5   TRIG 154* 100 168* 148       Last Cardiology Tests:  ECG:  An electrocardiogram performed today that I reviewed showed normal sinus rhythm and was normal.    Echo:  Echocardiogram November 3, 2022  CONCLUSIONS:   1. Left ventricular systolic function is normal with a 60-65% estimated ejection fraction.   2. Spectral Doppler shows an impaired relaxation pattern of left ventricular diastolic filling.       Cath:      Stress Test:  Exercise nuclear stress test November 3, 2022  The patient exercised to stage II on a Half Sonido protocol for 9 minutes and 00 seconds, achieving 7 METS. Patient received a total of 32.0 mCi of Myoview at 1:55:00 PM. The peak heart rate achieved was 133 bpm, which was 94 % of the age predicted target heart rate of 142 bpm.   Summary:  1. No clinical or electrocardiographic evidence for ischemia at a maximal workload.  2. Nuclear image results are reported separately.  3. The adequate level of stress was achieved.   IMPRESSION:  1.  Normal myocardial perfusion study without evidence of ischemia or  prior infarction.  2. The left ventricle is normal in size.  3. Normal LV wall motion with an LV EF estimated at greater than 65%.      Cardiac Imaging:        Assessment/Plan   Diagnoses and all orders for this visit:  Coronary " artery disease involving native coronary artery of native heart without angina pectoris  Essential hypertension  -     ECG 12 lead (Clinic Performed)  Pure hypercholesterolemia  History of myocardial infarction  Shortness of breath  -     XR chest 2 views; Future  -     B-Type Natriuretic Peptide; Future  -     D-dimer, VTE Exclusion; Future    In summary Ms. Price is a pleasant 79 year-old white female with a past medical history significant for coronary artery disease status post PCI in the setting of a myocardial infarction in 2004, hypertension, and hyperlipidemia.  She does note relatively stable persistent dyspnea on exertion.  She has no angina.  She had no evidence of ischemia on prior SPECT.  I did order a chest x-ray, BNP, and D-dimer.  I did decrease her metoprolol to 25 mg daily in case there is a component of chronotropic incompetence.  I suspect that symptoms may be related to deconditioning.  She will notify us if the symptoms worsen or persist.  She should continue her other cardiovascular medications.  We will see her back in follow-up in 3 months.      Orders:  Orders Placed This Encounter   Procedures    XR chest 2 views    B-Type Natriuretic Peptide    D-dimer, VTE Exclusion    ECG 12 lead (Clinic Performed)      Followup Appts:  Future Appointments   Date Time Provider Department Center   7/17/2024  1:15 PM Darryl Stout MD [id number] [place]   7/24/2024 10:30 AM MATTHEW Singh-CNP [id number] [place]           ____________________________________________________________  Garrett Posadas MD  [facility] [place]  [facility]

## 2024-04-24 ENCOUNTER — TELEPHONE (OUTPATIENT)
Dept: CARDIOLOGY | Facility: HOSPITAL | Age: 79
End: 2024-04-24
Payer: MEDICARE

## 2024-04-24 ENCOUNTER — HOSPITAL ENCOUNTER (OUTPATIENT)
Dept: RADIOLOGY | Facility: HOSPITAL | Age: 79
Discharge: HOME | End: 2024-04-24
Payer: MEDICARE

## 2024-04-24 DIAGNOSIS — R06.02 SHORTNESS OF BREATH: ICD-10-CM

## 2024-04-24 DIAGNOSIS — R06.02 SHORTNESS OF BREATH: Primary | ICD-10-CM

## 2024-04-24 DIAGNOSIS — R79.89 D-DIMER, ELEVATED: ICD-10-CM

## 2024-04-24 LAB
CREAT SERPL-MCNC: 0.6 MG/DL (ref 0.6–1.3)
GFR SERPL CREATININE-BSD FRML MDRD: >90 ML/MIN/1.73M*2

## 2024-04-24 PROCEDURE — 82565 ASSAY OF CREATININE: CPT

## 2024-04-24 PROCEDURE — 2550000001 HC RX 255 CONTRASTS: Performed by: INTERNAL MEDICINE

## 2024-04-24 PROCEDURE — 71275 CT ANGIOGRAPHY CHEST: CPT

## 2024-04-24 PROCEDURE — 71275 CT ANGIOGRAPHY CHEST: CPT | Performed by: RADIOLOGY

## 2024-04-24 RX ADMIN — IOHEXOL 75 ML: 350 INJECTION, SOLUTION INTRAVENOUS at 12:14

## 2024-04-24 NOTE — TELEPHONE ENCOUNTER
Patient scheduled for CT angio to rule out pulmonary embolism. Patient experienced dizziness and nausea 30 years ago when she had iodinated contrast media. She denies vomiting, rash, hives, shortness of breath, swelling of the tongue or lips, feeling of her throat closing or scratchiness of her throat. Dr. Cuauhtemoc gomez.

## 2024-04-24 NOTE — TELEPHONE ENCOUNTER
Result Communication    Resulted Orders   B-Type Natriuretic Peptide   Result Value Ref Range    BNP 71 0 - 99 pg/mL    Narrative       <100 pg/mL - Heart failure unlikely  100-299 pg/mL - Intermediate probability of acute heart                  failure exacerbation. Correlate with clinical                  context and patient history.    >=300 pg/mL - Heart Failure likely. Correlate with clinical                  context and patient history.    BNP testing is performed using different testing methodology at [facility] than at other [facility]. Direct result comparisons should only be made within the same method.      D-dimer, VTE Exclusion   Result Value Ref Range    D-Dimer, Quantitative VTE Exclusion 1,318 (H) <=500 ng/mL FEU    Narrative    The VTE Exclusion D-Dimer assay is reported in ng/mL Fibrinogen Equivalent Units (FEU).    Per 's instructions for use, a value of less than 500 ng/mL (FEU) may help to exclude DVT or PE in outpatients when the assay is used with a clinical pretest probability assessment.(A[facility] must utilize and document eCalc 'Wells Score Deep Vein Thrombosis Risk' for DVT exclusion only. Emergency Department should utilize  Guidelines for Emergency Department Use of the VTE Exclusion D-Dimer and Clinical Pretest probability assessment model for DVT or PE exclusion.)       10:22 AM      Results were successfully communicated with the patient and they acknowledged their understanding.

## 2024-04-24 NOTE — TELEPHONE ENCOUNTER
----- Message from Garrett Posadas MD sent at 4/23/2024  6:36 PM EDT -----  Normal BNP. Elevated D-dimer. Recommend CT PE protocol - she denies a contrast allergy.

## 2024-04-25 DIAGNOSIS — Z86.39 HISTORY OF MIXED HYPERLIPIDEMIA: Primary | ICD-10-CM

## 2024-04-25 DIAGNOSIS — Z86.39 PERSONAL HISTORY OF OTHER ENDOCRINE, NUTRITIONAL AND METABOLIC DISEASE: ICD-10-CM

## 2024-04-25 RX ORDER — ATORVASTATIN CALCIUM 40 MG/1
40 TABLET, FILM COATED ORAL DAILY
Qty: 90 TABLET | Refills: 3 | Status: SHIPPED | OUTPATIENT
Start: 2024-04-25

## 2024-04-25 NOTE — RESULT ENCOUNTER NOTE
No evidence of pulmonary embolism.  Small hiatal hernia.  Liver cyst versus hemangioma.  Small left renal stone.  Follow-up with primary physician regarding liver and kidney findings.

## 2024-04-29 ENCOUNTER — TELEPHONE (OUTPATIENT)
Dept: CARDIOLOGY | Facility: HOSPITAL | Age: 79
End: 2024-04-29
Payer: MEDICARE

## 2024-04-29 NOTE — TELEPHONE ENCOUNTER
Spoke with patient regarding results.     She reports that decreasing metoprolol has greatly improved her SOB. She monitors her BP's which have been WNL.

## 2024-04-29 NOTE — TELEPHONE ENCOUNTER
Patient's phone goes directly to [employer]. Message left providing CT and Xray results. Patient encouraged to call with any questions or concerns.

## 2024-07-14 LAB
ATRIAL RATE: 66 BPM
P AXIS: 70 DEGREES
P OFFSET: 216 MS
P ONSET: 154 MS
PR INTERVAL: 148 MS
Q ONSET: 228 MS
QRS COUNT: 11 BEATS
QRS DURATION: 90 MS
QT INTERVAL: 396 MS
QTC CALCULATION(BAZETT): 415 MS
QTC FREDERICIA: 409 MS
R AXIS: 47 DEGREES
T AXIS: 70 DEGREES
T OFFSET: 426 MS
VENTRICULAR RATE: 66 BPM

## 2024-07-17 ENCOUNTER — APPOINTMENT (OUTPATIENT)
Dept: PRIMARY CARE | Facility: CLINIC | Age: 79
End: 2024-07-17
Payer: MEDICARE

## 2024-07-17 VITALS — BODY MASS INDEX: 27.12 KG/M2 | SYSTOLIC BLOOD PRESSURE: 122 MMHG | WEIGHT: 168 LBS | DIASTOLIC BLOOD PRESSURE: 90 MMHG

## 2024-07-17 DIAGNOSIS — F41.9 ANXIETY: ICD-10-CM

## 2024-07-17 PROCEDURE — 99213 OFFICE O/P EST LOW 20 MIN: CPT | Performed by: STUDENT IN AN ORGANIZED HEALTH CARE EDUCATION/TRAINING PROGRAM

## 2024-07-17 PROCEDURE — G2211 COMPLEX E/M VISIT ADD ON: HCPCS | Performed by: STUDENT IN AN ORGANIZED HEALTH CARE EDUCATION/TRAINING PROGRAM

## 2024-07-17 PROCEDURE — 3080F DIAST BP >= 90 MM HG: CPT | Performed by: STUDENT IN AN ORGANIZED HEALTH CARE EDUCATION/TRAINING PROGRAM

## 2024-07-17 PROCEDURE — 1036F TOBACCO NON-USER: CPT | Performed by: STUDENT IN AN ORGANIZED HEALTH CARE EDUCATION/TRAINING PROGRAM

## 2024-07-17 PROCEDURE — 3074F SYST BP LT 130 MM HG: CPT | Performed by: STUDENT IN AN ORGANIZED HEALTH CARE EDUCATION/TRAINING PROGRAM

## 2024-07-17 RX ORDER — LORAZEPAM 1 MG/1
1 TABLET ORAL EVERY 8 HOURS PRN
Qty: 270 TABLET | Refills: 0 | Status: SHIPPED | OUTPATIENT
Start: 2024-07-17

## 2024-07-17 NOTE — PROGRESS NOTES
Follow up and med refill and left ear blockage    Subjective   Patient ID: Jasmine Price is a 79 y.o. female who presents for Follow-up, left ear blockage with sounds, and Med Refill.    HPI     Presents for follow-up medication refill.  Also since recent plane trip at the end of June has been feeling like left ear is blocked.  Had started along with her flights.    Review of Systems    8 point review of systems is otherwise negative unless mentioned on HPI      Objective   /90   Wt 76.2 kg (168 lb)   BMI 27.12 kg/m²     Physical Exam    General: No acute distress  HEENT: EOMI  CV: Regular rate and rhythm, normal S1 and S2, no murmurs  Pulm: Clear to auscultation bilaterally, no wheezings, rales or rhonchi  Abd: Nondistended  MSK: 5/5 strength in all extremities  Skin: No rashes   Lymphatic: No lymphadenopathy    TM pearly bilaterally, no cerumen     Assessment/Plan       Likely eustachian tube dysfunction after flying  -To start Flonase twice a day for 1 week  -If no improvement will call and let us know will prescribe course of prednisone    Anxiety  -Continue lorazepam 1 mg to 3 times daily as needed, at times will take twice a day. Did discuss weaning back to twice a day in the future  -Discussed side effect profile states that she is not experiencing any  -OARRS reviewed, no concerning behavior  -Drug contract and urine screen 7/11/2023     Occasional wheezing at nighttime  -Discussed trial of albuterol inhaler, not overly bothersome at this time and will plan to monitor.     CAD/DLD/HTN  -Follows with cardiology   -Continue aspirin and statin  -Continue metoprolol  -Stress test 11/2022 that was normal      Bladder prolapse  -Follows with gynecology     Health maintenance  -Signed DNAR-CCA paperwork, has healthcare power of  designated   -Has received both Covid vaccines and booster  -Mammogram 9/2023  -Pneumovax 2014  -Received Shingrix  -Tdap 2021      RTC 3 months     This note was dictated  by speech recognition. Minor errors in transcription may be present.

## 2024-07-22 ENCOUNTER — OFFICE VISIT (OUTPATIENT)
Dept: ORTHOPEDIC SURGERY | Facility: CLINIC | Age: 79
End: 2024-07-22
Payer: MEDICARE

## 2024-07-22 ENCOUNTER — HOSPITAL ENCOUNTER (OUTPATIENT)
Dept: RADIOLOGY | Facility: CLINIC | Age: 79
Discharge: HOME | End: 2024-07-22
Payer: MEDICARE

## 2024-07-22 VITALS — BODY MASS INDEX: 27.6 KG/M2 | WEIGHT: 171 LBS

## 2024-07-22 DIAGNOSIS — M17.10 ARTHRITIS OF KNEE: Primary | ICD-10-CM

## 2024-07-22 DIAGNOSIS — M25.562 LEFT KNEE PAIN, UNSPECIFIED CHRONICITY: ICD-10-CM

## 2024-07-22 PROCEDURE — 2500000005 HC RX 250 GENERAL PHARMACY W/O HCPCS: Performed by: ORTHOPAEDIC SURGERY

## 2024-07-22 PROCEDURE — 73562 X-RAY EXAM OF KNEE 3: CPT | Mod: LT

## 2024-07-22 PROCEDURE — 73562 X-RAY EXAM OF KNEE 3: CPT | Mod: LEFT SIDE | Performed by: RADIOLOGY

## 2024-07-22 PROCEDURE — 2500000004 HC RX 250 GENERAL PHARMACY W/ HCPCS (ALT 636 FOR OP/ED): Performed by: ORTHOPAEDIC SURGERY

## 2024-07-22 PROCEDURE — 99213 OFFICE O/P EST LOW 20 MIN: CPT | Mod: 25 | Performed by: ORTHOPAEDIC SURGERY

## 2024-07-22 PROCEDURE — 1159F MED LIST DOCD IN RCRD: CPT | Performed by: ORTHOPAEDIC SURGERY

## 2024-07-22 PROCEDURE — 20610 DRAIN/INJ JOINT/BURSA W/O US: CPT | Mod: LT | Performed by: ORTHOPAEDIC SURGERY

## 2024-07-22 RX ORDER — LIDOCAINE HYDROCHLORIDE 20 MG/ML
2 INJECTION, SOLUTION INFILTRATION; PERINEURAL
Status: COMPLETED | OUTPATIENT
Start: 2024-07-22 | End: 2024-07-22

## 2024-07-22 RX ORDER — METHYLPREDNISOLONE ACETATE 40 MG/ML
40 INJECTION, SUSPENSION INTRA-ARTICULAR; INTRALESIONAL; INTRAMUSCULAR; SOFT TISSUE
Status: COMPLETED | OUTPATIENT
Start: 2024-07-22 | End: 2024-07-22

## 2024-07-22 ASSESSMENT — PAIN SCALES - GENERAL: PAINLEVEL_OUTOF10: 5 - MODERATE PAIN

## 2024-07-22 ASSESSMENT — PAIN - FUNCTIONAL ASSESSMENT: PAIN_FUNCTIONAL_ASSESSMENT: 0-10

## 2024-07-22 ASSESSMENT — PAIN DESCRIPTION - DESCRIPTORS: DESCRIPTORS: SHARP

## 2024-07-22 NOTE — PROGRESS NOTES
Patient was reviewed and discussed with NICKI and/or orthopedic resident.  Patient was seen and evaluated in conjunction with NICKI and/or orthopedic resident. Findings and treatment plan were discussed and approved by myself, Dr. Chavez.    Assessment: Left knee osteoarthritis.    Plan: Discussed nonoperative and operative options in detail.   Risk and benefits discussed in detail. All questions answered today.  Recovery timeline and expectations discussed in detail.  Discussed purpose of cortisone injection.  Hopefully she will get back to her baseline.  Discussed possible gel injections.  Discussed possible surgical option in the future.  After informed consent under sterile conditions the left knee was injected with a combination of  2cc 2% lidocaine and 40mg Methylprednisolone. Risks and benefits were discussed in detail.  Patient ID: Jasmine Price is a 79 y.o. female.    L Inj/Asp: L knee on 7/22/2024 4:55 PM  Indications: pain, joint swelling and diagnostic evaluation  Details: 21 G needle, anterolateral approach  Medications: 40 mg methylPREDNISolone acetate 40 mg/mL; 2 mL lidocaine 20 mg/mL (2 %)  Outcome: tolerated well, no immediate complications  Procedure, treatment alternatives, risks and benefits explained, specific risks discussed. Consent was given by the patient. Immediately prior to procedure a time out was called to verify the correct patient, procedure, equipment, support staff and site/side marked as required. Patient was prepped and draped in the usual sterile fashion.

## 2024-07-22 NOTE — PROGRESS NOTES
79F here for left knee pain. Patient had a vacation at the end of June which involved a lot of walking and caused her to have ongoing diffuse left knee pain. Denies any trauma or acute events. Patient has been using ice and OTC pain medications PRN for relief with limited improvement. Of note, patient had a prior left knee arthroscopic debridement of a meniscal tear and chondroplasty years prior with Dr. Chavez. Denies DM, kidney, GI, hematologic disease.     Exam: left knee diffuse pain, no specific JLT.   Ligamentous stable.   Healed arthroscopic surgical incisions, no erythema or effusion  Full left knee ROM 0-120+    XR left knee reviewed. Mild/moderate left knee osteoarthritis, mild varus alignment.     Assessment: left knee osteoarthritis    Plan:  Discussed operative versus nonoperative treatment options and their pros/cons   Patient agreed to proceed with left knee CSI which was given using sterile technique. Patient tolerated procedure.   Follow up PRN.

## 2024-07-24 ENCOUNTER — OFFICE VISIT (OUTPATIENT)
Dept: CARDIOLOGY | Facility: HOSPITAL | Age: 79
End: 2024-07-24
Payer: MEDICARE

## 2024-07-24 VITALS
SYSTOLIC BLOOD PRESSURE: 124 MMHG | HEART RATE: 49 BPM | DIASTOLIC BLOOD PRESSURE: 70 MMHG | BODY MASS INDEX: 27.51 KG/M2 | HEIGHT: 66 IN | WEIGHT: 171.2 LBS

## 2024-07-24 DIAGNOSIS — I10 ESSENTIAL (PRIMARY) HYPERTENSION: ICD-10-CM

## 2024-07-24 DIAGNOSIS — I10 ESSENTIAL HYPERTENSION: ICD-10-CM

## 2024-07-24 DIAGNOSIS — R00.1 BRADYCARDIA: ICD-10-CM

## 2024-07-24 DIAGNOSIS — I25.2 HISTORY OF MYOCARDIAL INFARCTION: ICD-10-CM

## 2024-07-24 DIAGNOSIS — I25.10 CORONARY ARTERY DISEASE INVOLVING NATIVE CORONARY ARTERY OF NATIVE HEART WITHOUT ANGINA PECTORIS: Primary | ICD-10-CM

## 2024-07-24 DIAGNOSIS — E78.5 HYPERLIPIDEMIA, UNSPECIFIED HYPERLIPIDEMIA TYPE: ICD-10-CM

## 2024-07-24 LAB
ATRIAL RATE: 54 BPM
P AXIS: 61 DEGREES
P OFFSET: 206 MS
P ONSET: 146 MS
PR INTERVAL: 156 MS
Q ONSET: 224 MS
QRS COUNT: 9 BEATS
QRS DURATION: 90 MS
QT INTERVAL: 426 MS
QTC CALCULATION(BAZETT): 403 MS
QTC FREDERICIA: 411 MS
R AXIS: 42 DEGREES
T AXIS: 53 DEGREES
T OFFSET: 437 MS
VENTRICULAR RATE: 54 BPM

## 2024-07-24 PROCEDURE — 3074F SYST BP LT 130 MM HG: CPT | Performed by: NURSE PRACTITIONER

## 2024-07-24 PROCEDURE — 1126F AMNT PAIN NOTED NONE PRSNT: CPT | Performed by: NURSE PRACTITIONER

## 2024-07-24 PROCEDURE — 1160F RVW MEDS BY RX/DR IN RCRD: CPT | Performed by: NURSE PRACTITIONER

## 2024-07-24 PROCEDURE — 1159F MED LIST DOCD IN RCRD: CPT | Performed by: NURSE PRACTITIONER

## 2024-07-24 PROCEDURE — 99214 OFFICE O/P EST MOD 30 MIN: CPT | Performed by: NURSE PRACTITIONER

## 2024-07-24 PROCEDURE — 3078F DIAST BP <80 MM HG: CPT | Performed by: NURSE PRACTITIONER

## 2024-07-24 PROCEDURE — G2211 COMPLEX E/M VISIT ADD ON: HCPCS | Performed by: NURSE PRACTITIONER

## 2024-07-24 PROCEDURE — 1036F TOBACCO NON-USER: CPT | Performed by: NURSE PRACTITIONER

## 2024-07-24 PROCEDURE — 93005 ELECTROCARDIOGRAM TRACING: CPT | Performed by: NURSE PRACTITIONER

## 2024-07-24 RX ORDER — METOPROLOL SUCCINATE 25 MG/1
12.5 TABLET, EXTENDED RELEASE ORAL DAILY
Qty: 45 TABLET | Refills: 3 | Status: SHIPPED | OUTPATIENT
Start: 2024-07-24 | End: 2025-07-24

## 2024-07-24 ASSESSMENT — PAIN SCALES - GENERAL: PAINLEVEL: 0-NO PAIN

## 2024-07-24 NOTE — PROGRESS NOTES
Primary Care Physician: Darryl Stout MD  Date of Visit: 07/24/2024 10:30 AM EDT  Location of visit: [facility]     Chief Complaint:   Chief Complaint   Patient presents with    Follow-up     3 month        HPI / Summary:   Jasmine Price is a 79 y.o. female presents for followup. Seen in collaboration with Dr. Posadas. Her dyspnea on exertion has improved since taking lower dose of Metoprolol. She had been on vacation in [place] at end of June walking frequently without chest pain. Since this time she has not been exercising due to left knee pain.  The patient denies chest pain, palpitations, lightheadedness, syncope, orthopnea, paroxysmal nocturnal dyspnea, lower extremity edema, or bleeding problems.                Past Medical History:  Past Medical History:   Diagnosis Date    Generalized anxiety disorder 04/15/2015    Generalized anxiety disorder    Other specified postprocedural states     H/O colonoscopy    Personal history of other medical treatment     H/O bone density study    Personal history of other medical treatment     History of mammogram    Vitamin D deficiency, unspecified     Vitamin D deficiency        Past Surgical History:  No past surgical history on file.       Social History:   reports that she has quit smoking. Her smoking use included cigarettes. She has quit using smokeless tobacco. She reports current alcohol use. She reports that she does not use drugs.     Family History:  family history is not on file.      Allergies:  Allergies   Allergen Reactions    Bacitracin Rash       Outpatient Medications:  Current Outpatient Medications   Medication Instructions    aspirin 81 mg capsule 1 tablet, oral, Daily    atorvastatin (LIPITOR) 40 mg, oral, Daily    loratadine (CLARITIN) 10 mg, oral, Daily, As needed    LORazepam (ATIVAN) 1 mg, oral, Every 8 hours PRN    melatonin 10 mg tablet oral, Nightly    metoprolol succinate XL (Toprol-XL) 50 mg 24 hr tablet TAKE 1 TABLET BY MOUTH EVERY  "DAY    phenazopyridine (URINARY PAIN RELIEF) 95 mg, oral, 3 times daily PRN       Physical Exam:  Vitals:    07/24/24 1030   BP: (!) 135/44   BP Location: Right arm   Pulse: (!) 49   Weight: 77.7 kg (171 lb 3.2 oz)   Height: 1.676 m (5' 6\")     Wt Readings from Last 5 Encounters:   07/24/24 77.7 kg (171 lb 3.2 oz)   07/22/24 77.6 kg (171 lb)   07/17/24 76.2 kg (168 lb)   04/23/24 76.5 kg (168 lb 11.2 oz)   04/09/24 78.5 kg (173 lb)     Body mass index is 27.63 kg/m².   GENERAL: alert, cooperative, pleasant, in no acute distress  SKIN: warm and dry  NECK: Normal JVD, negative HJR  CARDIAC: Regular rate and rhythm with no rubs, murmurs, or gallops  CHEST: Normal respiratory efforts, lungs clear to auscultation bilaterally.  ABDOMEN: soft, nontender, nondistended  EXTREMITIES: no edema, +2 palpable RP and PT pulses bilaterally       Last Labs:  Recent Labs     01/25/24  1020 10/27/22  0935 04/01/21  0912   WBC 7.3 6.8 7.5   HGB 13.9 13.1 14.3   HCT 44.0 40.7 44.3    204 222   MCV 98 95 100     Recent Labs     01/25/24  1020 10/27/22  0935 04/01/21  0912    142 143   K 4.7 4.5 4.9    107 105   BUN 23 18 18   CREATININE 0.83 0.81 0.83     CMP -  Lab Results   Component Value Date    CALCIUM 8.7 01/25/2024    PROT 6.9 01/25/2024    ALBUMIN 4.0 01/25/2024    AST 18 01/25/2024    ALT 13 01/25/2024    ALKPHOS 62 01/25/2024    BILITOT 0.6 01/25/2024       LIPID PANEL -   Lab Results   Component Value Date    CHOL 128 01/25/2024    HDL 47.0 01/25/2024    LDLF 53 10/27/2022    TRIG 154 (H) 01/25/2024   LDL 50 1/25/24    Lab Results   Component Value Date    BNP 71 04/23/2024       Last Cardiology Tests:  ECG:  Reviewed EKG from 4/23/24- normal sinus rhythm HR 66    Echo:  Echocardiogram November 3, 2022  CONCLUSIONS:   1. Left ventricular systolic function is normal with a 60-65% estimated ejection fraction.   2. Spectral Doppler shows an impaired relaxation pattern of left ventricular diastolic filling.      "   Cath:        Stress Test:  Exercise nuclear stress test November 3, 2022  The patient exercised to stage II on a Half Sonido protocol for 9 minutes and 00 seconds, achieving 7 METS. Patient received a total of 32.0 mCi of Myoview at 1:55:00 PM. The peak heart rate achieved was 133 bpm, which was 94 % of the age predicted target heart rate of 142 bpm.   Summary:  1. No clinical or electrocardiographic evidence for ischemia at a maximal workload.  2. Nuclear image results are reported separately.  3. The adequate level of stress was achieved.   IMPRESSION:  1.  Normal myocardial perfusion study without evidence of ischemia or  prior infarction.  2. The left ventricle is normal in size.  3. Normal LV wall motion with an LV EF estimated at greater than 65%.        Cardiac Imaging:    Assessment/Plan   Problem List Items Addressed This Visit          Cardiac and Vasculature    Coronary artery disease involving native coronary artery of native heart without angina pectoris - Primary    Relevant Medications    metoprolol succinate XL (Toprol-XL) 25 mg 24 hr tablet    Essential hypertension    History of myocardial infarction    Hyperlipidemia     Other Visit Diagnoses       Bradycardia        Relevant Orders    ECG 12 lead (Clinic Performed)    Essential (primary) hypertension        Relevant Medications    metoprolol succinate XL (Toprol-XL) 25 mg 24 hr tablet          In summary Ms. Price is a pleasant 79 year-old white female with a past medical history significant for coronary artery disease status post PCI in the setting of a myocardial infarction in 2004, hypertension, and hyperlipidemia.  She does note relatively stable persistent dyspnea on exertion that has improved since Metoprolol dose was decreased.  She has no angina.  She had no evidence of ischemia on prior SPECT. Her EKG today showed sinus bradycardia HR 54. I did decrease her Metoprolol succinate to 12.5 mg once a day as I suspect she has chronotropic  incompetence given dyspnea improved on lower dose Metoprolol. She will let me know if symptoms persist or get worse.  Her blood pressure is controlled. Her recent lipid panel is at goal. I have encouraged her to increase physical activity as able when knee pain improves.  She should continue her other cardiovascular medications.  We will see her back in follow-up in 3 months.       Orders:  No orders of the defined types were placed in this encounter.     Followup Appts:  Future Appointments   Date Time Provider Department Center   10/18/2024 12:00 PM Darryl Stout MD [id number] [place]           ____________________________________________________________  Rachel Ponce, APRN-CNP  [facility]  [facility] [place]

## 2024-07-24 NOTE — PATIENT INSTRUCTIONS
Decrease Metoprolol succinate to 12.5 mg once a day ( cut your 25 mg tablet in half)  Continue all other cardiovascular medications  Follow up in 6 months  Increase physical activity as your knee allows  Please call if shortness of breath on exertion continues to persist or gets worse  Monitor blood pressure at home a couple times a week

## 2024-07-26 ENCOUNTER — TELEPHONE (OUTPATIENT)
Dept: PRIMARY CARE | Facility: CLINIC | Age: 79
End: 2024-07-26
Payer: MEDICARE

## 2024-07-26 DIAGNOSIS — H69.90 DYSFUNCTION OF EUSTACHIAN TUBE, UNSPECIFIED LATERALITY: Primary | ICD-10-CM

## 2024-07-26 RX ORDER — PREDNISONE 20 MG/1
40 TABLET ORAL DAILY
Qty: 10 TABLET | Refills: 0 | Status: SHIPPED | OUTPATIENT
Start: 2024-07-26 | End: 2024-07-31

## 2024-07-26 NOTE — TELEPHONE ENCOUNTER
Saw you last week and the nasal spray is not helping for head congestion and ear sounds. Said would order a steroid instead. Also had a steroid injection this week in knee and not sure if that would be too much steroids.

## 2024-08-09 ENCOUNTER — OFFICE VISIT (OUTPATIENT)
Dept: ORTHOPEDIC SURGERY | Facility: CLINIC | Age: 79
End: 2024-08-09
Payer: MEDICARE

## 2024-08-09 DIAGNOSIS — M17.10 ARTHRITIS OF KNEE: Primary | ICD-10-CM

## 2024-08-09 PROCEDURE — 99213 OFFICE O/P EST LOW 20 MIN: CPT | Performed by: ORTHOPAEDIC SURGERY

## 2024-08-09 PROCEDURE — 1159F MED LIST DOCD IN RCRD: CPT | Performed by: ORTHOPAEDIC SURGERY

## 2024-08-09 PROCEDURE — 1036F TOBACCO NON-USER: CPT | Performed by: ORTHOPAEDIC SURGERY

## 2024-08-09 ASSESSMENT — PAIN SCALES - GENERAL: PAINLEVEL_OUTOF10: 5 - MODERATE PAIN

## 2024-08-09 ASSESSMENT — PAIN DESCRIPTION - DESCRIPTORS: DESCRIPTORS: SHARP;ACHING

## 2024-08-09 ASSESSMENT — PAIN - FUNCTIONAL ASSESSMENT: PAIN_FUNCTIONAL_ASSESSMENT: 0-10

## 2024-08-09 NOTE — PROGRESS NOTES
No significant relief with cortisone shot.  Swelling pain along the medial knee.  Taking Advil for relief.    Exam: Tender medial joint line.  No flexion contracture.    I personally reviewed the following radiographic exams: Previous x-ray reviewed shows medial compartment sclerosis with subchondral cyst.    Assessment: Right knee arthrosis.    Plan: Will apply for gel shots.  Discussed possible knee replacement in the future.  Having daily functional pain.

## 2024-08-16 ENCOUNTER — TELEPHONE (OUTPATIENT)
Dept: CARDIOLOGY | Facility: HOSPITAL | Age: 79
End: 2024-08-16
Payer: MEDICARE

## 2024-08-16 DIAGNOSIS — I10 ESSENTIAL HYPERTENSION: Primary | ICD-10-CM

## 2024-08-16 RX ORDER — AMLODIPINE BESYLATE 5 MG/1
5 TABLET ORAL DAILY
Qty: 90 TABLET | Refills: 3 | Status: SHIPPED | OUTPATIENT
Start: 2024-08-16 | End: 2025-08-16

## 2024-08-16 NOTE — PROGRESS NOTES
Patient called with blood pressure readings in the 150s to 160s over 80s to 90s.  I recommended starting amlodipine 5 mg daily.

## 2024-08-16 NOTE — TELEPHONE ENCOUNTER
Patient called saying she was concerned about her BP. She was getting 161/92 and 152/93. She said her metoprolol was recently decreased to 12.5 mg and is concerned about the numbers she got.

## 2024-08-19 ENCOUNTER — TELEPHONE (OUTPATIENT)
Dept: PRIMARY CARE | Facility: CLINIC | Age: 79
End: 2024-08-19
Payer: MEDICARE

## 2024-08-19 DIAGNOSIS — Z12.31 ENCOUNTER FOR SCREENING MAMMOGRAM FOR MALIGNANT NEOPLASM OF BREAST: ICD-10-CM

## 2024-08-19 DIAGNOSIS — Z00.00 HEALTHCARE MAINTENANCE: Primary | ICD-10-CM

## 2024-08-20 NOTE — TELEPHONE ENCOUNTER
Per Dr. Posadas's last progress note he recommends starting amlodipine 5 mg daily. Left message for patient to call back.

## 2024-08-21 ENCOUNTER — TELEPHONE (OUTPATIENT)
Dept: ORTHOPEDIC SURGERY | Facility: CLINIC | Age: 79
End: 2024-08-21
Payer: MEDICARE

## 2024-08-21 NOTE — TELEPHONE ENCOUNTER
9/11 Patient stated that her knee feels fine and she no longer wants gel injections.     Approved for Euflexxa ,[facility] need to verify information with patient.(9/10)  Prior auth for Orthovisc submitted today via cover my meds. Waiting on approval   Key #[id number]

## 2024-08-23 DIAGNOSIS — M17.12 PRIMARY LOCALIZED OSTEOARTHRITIS OF LEFT KNEE: ICD-10-CM

## 2024-08-26 RX ORDER — HYALURONATE SODIUM 20 MG/2 ML
20 SYRINGE (ML) INTRAARTICULAR
Qty: 6 ML | Refills: 0 | Status: SHIPPED | OUTPATIENT
Start: 2024-09-01

## 2024-08-29 NOTE — TELEPHONE ENCOUNTER
She reports the following blood pressure readings after starting amlodipine 5 mg daily: 99/72, 109/69, and 129/76. She would like to know if these blood pressures are acceptable for Dr. Posadas.     Can someone call her to see how's she's feeling? Any dizziness or lightheadedness with the lower blood pressure readings?

## 2024-08-30 NOTE — TELEPHONE ENCOUNTER
Spoke to patient who said her HR has been in the 70s-80s mostly, occasionally in the 60s. No dizziness, lightheadedness, or syncope., with the lower BP readings.  She feels good. She is aware that her next appointment in in January and if she has any issues between now and then to call and we will address them either over the phone or by bringing her in if needed. Patient verbalized understanding.

## 2024-09-05 ENCOUNTER — HOSPITAL ENCOUNTER (EMERGENCY)
Facility: HOSPITAL | Age: 79
Discharge: HOME | End: 2024-09-05
Payer: MEDICARE

## 2024-09-05 ENCOUNTER — APPOINTMENT (OUTPATIENT)
Dept: RADIOLOGY | Facility: HOSPITAL | Age: 79
End: 2024-09-05
Payer: MEDICARE

## 2024-09-05 VITALS
SYSTOLIC BLOOD PRESSURE: 130 MMHG | HEART RATE: 68 BPM | BODY MASS INDEX: 26.95 KG/M2 | DIASTOLIC BLOOD PRESSURE: 80 MMHG | RESPIRATION RATE: 18 BRPM | TEMPERATURE: 98.2 F | OXYGEN SATURATION: 97 % | WEIGHT: 167 LBS

## 2024-09-05 DIAGNOSIS — M16.11 OSTEOARTHRITIS OF RIGHT HIP, UNSPECIFIED OSTEOARTHRITIS TYPE: Primary | ICD-10-CM

## 2024-09-05 DIAGNOSIS — M54.31 SCIATICA, RIGHT SIDE: ICD-10-CM

## 2024-09-05 PROCEDURE — 2500000001 HC RX 250 WO HCPCS SELF ADMINISTERED DRUGS (ALT 637 FOR MEDICARE OP): Performed by: NURSE PRACTITIONER

## 2024-09-05 PROCEDURE — 73502 X-RAY EXAM HIP UNI 2-3 VIEWS: CPT | Mod: RT

## 2024-09-05 PROCEDURE — 73502 X-RAY EXAM HIP UNI 2-3 VIEWS: CPT | Mod: RIGHT SIDE | Performed by: RADIOLOGY

## 2024-09-05 PROCEDURE — 72100 X-RAY EXAM L-S SPINE 2/3 VWS: CPT | Performed by: RADIOLOGY

## 2024-09-05 PROCEDURE — 99284 EMERGENCY DEPT VISIT MOD MDM: CPT

## 2024-09-05 PROCEDURE — 72100 X-RAY EXAM L-S SPINE 2/3 VWS: CPT

## 2024-09-05 RX ORDER — TIZANIDINE 2 MG/1
2 TABLET ORAL 2 TIMES DAILY
Qty: 20 TABLET | Refills: 0 | Status: SHIPPED | OUTPATIENT
Start: 2024-09-05 | End: 2024-09-15

## 2024-09-05 RX ORDER — OXYCODONE AND ACETAMINOPHEN 5; 325 MG/1; MG/1
1 TABLET ORAL ONCE
Status: COMPLETED | OUTPATIENT
Start: 2024-09-05 | End: 2024-09-05

## 2024-09-05 RX ORDER — DICLOFENAC SODIUM 10 MG/G
4 GEL TOPICAL 4 TIMES DAILY
Qty: 20 G | Refills: 0 | Status: SHIPPED | OUTPATIENT
Start: 2024-09-05

## 2024-09-05 RX ORDER — PREDNISONE 20 MG/1
20 TABLET ORAL DAILY
Qty: 5 TABLET | Refills: 0 | Status: SHIPPED | OUTPATIENT
Start: 2024-09-05 | End: 2024-09-10

## 2024-09-05 ASSESSMENT — COLUMBIA-SUICIDE SEVERITY RATING SCALE - C-SSRS
2. HAVE YOU ACTUALLY HAD ANY THOUGHTS OF KILLING YOURSELF?: NO
1. IN THE PAST MONTH, HAVE YOU WISHED YOU WERE DEAD OR WISHED YOU COULD GO TO SLEEP AND NOT WAKE UP?: NO
6. HAVE YOU EVER DONE ANYTHING, STARTED TO DO ANYTHING, OR PREPARED TO DO ANYTHING TO END YOUR LIFE?: NO

## 2024-09-05 ASSESSMENT — PAIN DESCRIPTION - ORIENTATION: ORIENTATION: RIGHT

## 2024-09-05 ASSESSMENT — PAIN SCALES - GENERAL: PAINLEVEL_OUTOF10: 8

## 2024-09-05 ASSESSMENT — PAIN DESCRIPTION - LOCATION: LOCATION: HIP

## 2024-09-05 NOTE — ED TRIAGE NOTES
Pt here states was pulling weeds yesterday and felt a sharp hip pain on the r. This am worse and trouble ambulating.

## 2024-09-05 NOTE — ED PROVIDER NOTES
HPI   Chief Complaint   Patient presents with    Hip Pain       79-year-old female was weeding in her backyard yesterday and she noticed acute pain in her right lower pelvic.  She thought it would resolve and she was able to sleep well last night and she felt like it got better but when she awoke this morning it is more difficult for her to ambulate due to the pain.  If she sitting in a chair and not moving the pain 0 out of 10.  If she tries to ambulate it goes to 8 out of 10.  She denies prior history of this pain.  She denies incontinence of urine or stool.  She denies paresthesia of her lower extremities.  She denies chest pain, dyspnea, abdominal pain, nausea or vomiting.  When asked if 1000 mg acetaminophen will help with her pain she indicates that that is not strong enough and would be more comfortable with the Percocet where she can go home and lie down and rest.    Her medical history includes general anxiety disorder, vitamin D deficiency, hypertension, her cardiologist Dr. Garrett Poasdas recently reduced her metoprolol and added amlodipine.  Her vital signs are normal and stable in triage.      History provided by:  Patient and relative (Daughter is bedside)          Patient History   Past Medical History:   Diagnosis Date    Generalized anxiety disorder 04/15/2015    Generalized anxiety disorder    Other specified postprocedural states     H/O colonoscopy    Personal history of other medical treatment     H/O bone density study    Personal history of other medical treatment     History of mammogram    Vitamin D deficiency, unspecified     Vitamin D deficiency     No past surgical history on file.  No family history on file.  Social History     Tobacco Use    Smoking status: Former     Types: Cigarettes    Smokeless tobacco: Former   Substance Use Topics    Alcohol use: Yes     Comment: rarely    Drug use: Never       Physical Exam   ED Triage Vitals [09/05/24 1323]   Temperature Heart Rate Respirations BP    36.8 °C (98.2 °F) 68 18 130/80      Pulse Ox Temp Source Heart Rate Source Patient Position   97 % Temporal Monitor --      BP Location FiO2 (%)     -- --       Physical Exam  Constitutional:       Appearance: Normal appearance.   HENT:      Head: Normocephalic and atraumatic.      Right Ear: Tympanic membrane normal.      Left Ear: Tympanic membrane normal.      Nose: Nose normal.      Mouth/Throat:      Mouth: Mucous membranes are moist.   Eyes:      Extraocular Movements: Extraocular movements intact.      Pupils: Pupils are equal, round, and reactive to light.   Cardiovascular:      Rate and Rhythm: Normal rate and regular rhythm.      Pulses: Normal pulses.      Heart sounds: Normal heart sounds.   Pulmonary:      Effort: Pulmonary effort is normal.      Breath sounds: Normal breath sounds.   Abdominal:      General: Abdomen is flat.      Palpations: Abdomen is soft.   Musculoskeletal:         General: Tenderness present. Normal range of motion.      Cervical back: Normal range of motion and neck supple.      Comments: Right lower hip pain and right lateral hip pain appreciated lateralized lumbar pain appreciated there was no skin rash appreciated or change in skin temperature or color.   Skin:     General: Skin is warm.      Capillary Refill: Capillary refill takes less than 2 seconds.   Neurological:      General: No focal deficit present.      Mental Status: She is alert and oriented to person, place, and time.   Psychiatric:         Mood and Affect: Mood normal.           ED Course & MDM   Diagnoses as of 09/05/24 1711   Osteoarthritis of right hip, unspecified osteoarthritis type   Sciatica, right side                 No data recorded                                 Medical Decision Making  Patient received 1 Percocet and x-ray of hip and lumbar spine was ordered.  No radiographic evidence of acute bony abnormality.  Bilateral hip joint osteoarthrosis.  She had right gluteal medius calcific tendinopathy.   This is tendinitis.  Bones are under mineralize.  There is no evidence of an acute fracture or dislocation is seen.  She has moderate bilateral hip joint space narrowing with ring osteophyte formation.  There is linear calcification adjacent to the right greater trochanter and is consistent with a right gluteus medius calcific tendinopathy.  This was discussed with patient and they received copy of report and I requested appointment with orthopedic surgery.  Daughter seems to understand the importance of orthopedic surgery follow-up and I think patient will benefit from a prednisone burst for 5 days and low-dose I cut it in 2:30 milligram tizanidine.  She can also use diclofenac cream for some assistance.  Return precautions reviewed and safely discharged home.  Pedal and posterior tibial pulse 2/2 and this lowers my concern for ischemic event.    Amount and/or Complexity of Data Reviewed  Radiology: ordered and independent interpretation performed.        Procedure  Procedures     MATTHEW Garcia-CNP  09/05/24 [id number]

## 2024-10-18 ENCOUNTER — APPOINTMENT (OUTPATIENT)
Dept: PRIMARY CARE | Facility: CLINIC | Age: 79
End: 2024-10-18
Payer: MEDICARE

## 2024-10-18 VITALS
SYSTOLIC BLOOD PRESSURE: 120 MMHG | DIASTOLIC BLOOD PRESSURE: 80 MMHG | WEIGHT: 169.5 LBS | BODY MASS INDEX: 27.36 KG/M2 | OXYGEN SATURATION: 93 % | HEART RATE: 77 BPM

## 2024-10-18 DIAGNOSIS — F41.9 ANXIETY: ICD-10-CM

## 2024-10-18 PROCEDURE — 99213 OFFICE O/P EST LOW 20 MIN: CPT | Performed by: STUDENT IN AN ORGANIZED HEALTH CARE EDUCATION/TRAINING PROGRAM

## 2024-10-18 PROCEDURE — 3079F DIAST BP 80-89 MM HG: CPT | Performed by: STUDENT IN AN ORGANIZED HEALTH CARE EDUCATION/TRAINING PROGRAM

## 2024-10-18 PROCEDURE — 3074F SYST BP LT 130 MM HG: CPT | Performed by: STUDENT IN AN ORGANIZED HEALTH CARE EDUCATION/TRAINING PROGRAM

## 2024-10-18 PROCEDURE — G2211 COMPLEX E/M VISIT ADD ON: HCPCS | Performed by: STUDENT IN AN ORGANIZED HEALTH CARE EDUCATION/TRAINING PROGRAM

## 2024-10-18 PROCEDURE — 1159F MED LIST DOCD IN RCRD: CPT | Performed by: STUDENT IN AN ORGANIZED HEALTH CARE EDUCATION/TRAINING PROGRAM

## 2024-10-18 RX ORDER — LORAZEPAM 1 MG/1
1 TABLET ORAL EVERY 8 HOURS PRN
Qty: 270 TABLET | Refills: 0 | Status: SHIPPED | OUTPATIENT
Start: 2024-10-18

## 2024-10-18 ASSESSMENT — ANXIETY QUESTIONNAIRES
GAD7 TOTAL SCORE: 2
IF YOU CHECKED OFF ANY PROBLEMS ON THIS QUESTIONNAIRE, HOW DIFFICULT HAVE THESE PROBLEMS MADE IT FOR YOU TO DO YOUR WORK, TAKE CARE OF THINGS AT HOME, OR GET ALONG WITH OTHER PEOPLE: NOT DIFFICULT AT ALL
5. BEING SO RESTLESS THAT IT IS HARD TO SIT STILL: NOT AT ALL
6. BECOMING EASILY ANNOYED OR IRRITABLE: NOT AT ALL
7. FEELING AFRAID AS IF SOMETHING AWFUL MIGHT HAPPEN: NOT AT ALL
3. WORRYING TOO MUCH ABOUT DIFFERENT THINGS: SEVERAL DAYS
2. NOT BEING ABLE TO STOP OR CONTROL WORRYING: NOT AT ALL
1. FEELING NERVOUS, ANXIOUS, OR ON EDGE: SEVERAL DAYS
4. TROUBLE RELAXING: NOT AT ALL

## 2024-10-18 NOTE — PROGRESS NOTES
Subjective   Patient ID: Jasmine Price is a 79 y.o. female who presents for Follow-up (anxiety).    HPI     Follow-up  Med refill  Had ER visit for right lower back/right hip pain. Started after pulling motion with weeding  Was improved 85% by the next day    Review of Systems    8 point review of systems is otherwise negative unless mentioned on HPI      Objective   /80   Pulse 77   Wt 76.9 kg (169 lb 8 oz)   SpO2 93%   BMI 27.36 kg/m²     Physical Exam    General: No acute distress  HEENT: EOMI  CV: Regular rate and rhythm, normal S1 and S2, no murmurs  Pulm: Clear to auscultation bilaterally, no wheezings, rales or rhonchi  Abd: Nondistended  MSK: 5/5 strength in all extremities  Skin: No rashes   Lymphatic: No lymphadenopathy      Assessment/Plan       Anxiety  -Continue lorazepam 1 mg to 3 times daily as needed, at times will take twice a day. Did discuss weaning back to twice a day in the future  -Discussed side effect profile states that she is not experiencing any  -OARRS reviewed, no concerning behavior  -Drug contract and urine screen 7/11/2023     Occasional wheezing at nighttime  -Discussed trial of albuterol inhaler, not overly bothersome at this time and will plan to monitor.     CAD/DLD/HTN  -Follows with cardiology   -Continue aspirin and statin  -Metoprolol was changed to amlodipine   -Stress test 11/2022 that was normal      Bladder prolapse  -Follows with gynecology     Health maintenance  -Signed DNAR-CCA paperwork, has healthcare power of  designated   -Has received both Covid vaccines and booster  -Mammogram 9/2023  -Pneumovax 2014  -Received Shingrix  -Tdap 2021      RTC 3 months     This note was dictated by speech recognition. Minor errors in transcription may be present.

## 2024-10-22 ENCOUNTER — APPOINTMENT (OUTPATIENT)
Dept: ORTHOPEDIC SURGERY | Facility: CLINIC | Age: 79
End: 2024-10-22
Payer: MEDICARE

## 2024-12-03 ENCOUNTER — APPOINTMENT (OUTPATIENT)
Dept: ORTHOPEDIC SURGERY | Facility: CLINIC | Age: 79
End: 2024-12-03
Payer: MEDICARE

## 2024-12-27 ENCOUNTER — TELEPHONE (OUTPATIENT)
Dept: PRIMARY CARE | Facility: CLINIC | Age: 79
End: 2024-12-27
Payer: MEDICARE

## 2024-12-27 DIAGNOSIS — R30.0 DYSURIA: Primary | ICD-10-CM

## 2024-12-27 RX ORDER — NITROFURANTOIN 25; 75 MG/1; MG/1
100 CAPSULE ORAL 2 TIMES DAILY
Qty: 10 CAPSULE | Refills: 0 | Status: SHIPPED | OUTPATIENT
Start: 2024-12-27 | End: 2025-01-01

## 2024-12-27 NOTE — TELEPHONE ENCOUNTER
Believes she has a uti did home test positive for leukocytes is there a medication you can send in

## 2024-12-30 NOTE — TELEPHONE ENCOUNTER
PT CALLED HAVING SHARP QUICK PAINS GOING ACROSS LOWER ABDOMEN, HAS KIDNEY STONE ON OPPOSITE SIDE, WOULD LIKE TO TALK WITH YOU

## 2025-01-02 ENCOUNTER — APPOINTMENT (OUTPATIENT)
Dept: CARDIOLOGY | Facility: HOSPITAL | Age: 80
End: 2025-01-02
Payer: MEDICARE

## 2025-01-02 ENCOUNTER — HOSPITAL ENCOUNTER (EMERGENCY)
Facility: HOSPITAL | Age: 80
Discharge: HOME | End: 2025-01-02
Payer: MEDICARE

## 2025-01-02 ENCOUNTER — APPOINTMENT (OUTPATIENT)
Dept: RADIOLOGY | Facility: HOSPITAL | Age: 80
End: 2025-01-02
Payer: MEDICARE

## 2025-01-02 VITALS
HEIGHT: 66 IN | HEART RATE: 67 BPM | BODY MASS INDEX: 25.55 KG/M2 | RESPIRATION RATE: 16 BRPM | DIASTOLIC BLOOD PRESSURE: 62 MMHG | WEIGHT: 159 LBS | SYSTOLIC BLOOD PRESSURE: 96 MMHG | TEMPERATURE: 98.3 F | OXYGEN SATURATION: 94 %

## 2025-01-02 DIAGNOSIS — E87.6 HYPOKALEMIA: ICD-10-CM

## 2025-01-02 DIAGNOSIS — R31.9 HEMATURIA, UNSPECIFIED TYPE: ICD-10-CM

## 2025-01-02 DIAGNOSIS — E87.1 HYPONATREMIA: ICD-10-CM

## 2025-01-02 DIAGNOSIS — N39.0 UTI (URINARY TRACT INFECTION), UNCOMPLICATED: Primary | ICD-10-CM

## 2025-01-02 LAB
ALBUMIN SERPL BCP-MCNC: 3.8 G/DL (ref 3.4–5)
ALP SERPL-CCNC: 60 U/L (ref 33–136)
ALT SERPL W P-5'-P-CCNC: 13 U/L (ref 7–45)
ANION GAP SERPL CALC-SCNC: 11 MMOL/L (ref 10–20)
APPEARANCE UR: ABNORMAL
AST SERPL W P-5'-P-CCNC: 16 U/L (ref 9–39)
ATRIAL RATE: 69 BPM
BACTERIA #/AREA URNS AUTO: ABNORMAL /HPF
BASOPHILS # BLD AUTO: 0.03 X10*3/UL (ref 0–0.1)
BASOPHILS NFR BLD AUTO: 0.3 %
BILIRUB SERPL-MCNC: 0.6 MG/DL (ref 0–1.2)
BILIRUB UR STRIP.AUTO-MCNC: NEGATIVE MG/DL
BUN SERPL-MCNC: 19 MG/DL (ref 6–23)
CALCIUM SERPL-MCNC: 8.5 MG/DL (ref 8.6–10.3)
CARDIAC TROPONIN I PNL SERPL HS: 12 NG/L (ref 0–13)
CHLORIDE SERPL-SCNC: 99 MMOL/L (ref 98–107)
CO2 SERPL-SCNC: 24 MMOL/L (ref 21–32)
COLOR UR: YELLOW
CREAT SERPL-MCNC: 0.93 MG/DL (ref 0.5–1.05)
EGFRCR SERPLBLD CKD-EPI 2021: 63 ML/MIN/1.73M*2
EOSINOPHIL # BLD AUTO: 0.56 X10*3/UL (ref 0–0.4)
EOSINOPHIL NFR BLD AUTO: 5.7 %
ERYTHROCYTE [DISTWIDTH] IN BLOOD BY AUTOMATED COUNT: 13.4 % (ref 11.5–14.5)
GLUCOSE SERPL-MCNC: 128 MG/DL (ref 74–99)
GLUCOSE UR STRIP.AUTO-MCNC: ABNORMAL MG/DL
HCT VFR BLD AUTO: 40.9 % (ref 36–46)
HGB BLD-MCNC: 13.5 G/DL (ref 12–16)
HYALINE CASTS #/AREA URNS AUTO: ABNORMAL /LPF
IMM GRANULOCYTES # BLD AUTO: 0.05 X10*3/UL (ref 0–0.5)
IMM GRANULOCYTES NFR BLD AUTO: 0.5 % (ref 0–0.9)
KETONES UR STRIP.AUTO-MCNC: NEGATIVE MG/DL
LEUKOCYTE ESTERASE UR QL STRIP.AUTO: ABNORMAL
LYMPHOCYTES # BLD AUTO: 0.69 X10*3/UL (ref 0.8–3)
LYMPHOCYTES NFR BLD AUTO: 7 %
MCH RBC QN AUTO: 29.9 PG (ref 26–34)
MCHC RBC AUTO-ENTMCNC: 33 G/DL (ref 32–36)
MCV RBC AUTO: 91 FL (ref 80–100)
MONOCYTES # BLD AUTO: 0.68 X10*3/UL (ref 0.05–0.8)
MONOCYTES NFR BLD AUTO: 6.9 %
MUCOUS THREADS #/AREA URNS AUTO: ABNORMAL /LPF
NEUTROPHILS # BLD AUTO: 7.84 X10*3/UL (ref 1.6–5.5)
NEUTROPHILS NFR BLD AUTO: 79.6 %
NITRITE UR QL STRIP.AUTO: NEGATIVE
NRBC BLD-RTO: 0 /100 WBCS (ref 0–0)
P AXIS: 65 DEGREES
P OFFSET: 208 MS
P ONSET: 149 MS
PH UR STRIP.AUTO: 6 [PH]
PLATELET # BLD AUTO: 192 X10*3/UL (ref 150–450)
POTASSIUM SERPL-SCNC: 3.3 MMOL/L (ref 3.5–5.3)
PR INTERVAL: 150 MS
PROT SERPL-MCNC: 6.6 G/DL (ref 6.4–8.2)
PROT UR STRIP.AUTO-MCNC: ABNORMAL MG/DL
Q ONSET: 224 MS
QRS COUNT: 12 BEATS
QRS DURATION: 96 MS
QT INTERVAL: 398 MS
QTC CALCULATION(BAZETT): 426 MS
QTC FREDERICIA: 417 MS
R AXIS: 62 DEGREES
RBC # BLD AUTO: 4.52 X10*6/UL (ref 4–5.2)
RBC # UR STRIP.AUTO: ABNORMAL /UL
RBC #/AREA URNS AUTO: ABNORMAL /HPF
SODIUM SERPL-SCNC: 131 MMOL/L (ref 136–145)
SP GR UR STRIP.AUTO: 1.02
SQUAMOUS #/AREA URNS AUTO: ABNORMAL /HPF
T AXIS: 66 DEGREES
T OFFSET: 423 MS
UROBILINOGEN UR STRIP.AUTO-MCNC: NORMAL MG/DL
VENTRICULAR RATE: 69 BPM
WBC # BLD AUTO: 9.9 X10*3/UL (ref 4.4–11.3)
WBC #/AREA URNS AUTO: ABNORMAL /HPF
WBC CLUMPS #/AREA URNS AUTO: ABNORMAL /HPF

## 2025-01-02 PROCEDURE — 74176 CT ABD & PELVIS W/O CONTRAST: CPT

## 2025-01-02 PROCEDURE — 2500000001 HC RX 250 WO HCPCS SELF ADMINISTERED DRUGS (ALT 637 FOR MEDICARE OP): Performed by: NURSE PRACTITIONER

## 2025-01-02 PROCEDURE — 81003 URINALYSIS AUTO W/O SCOPE: CPT | Performed by: NURSE PRACTITIONER

## 2025-01-02 PROCEDURE — 85025 COMPLETE CBC W/AUTO DIFF WBC: CPT | Performed by: NURSE PRACTITIONER

## 2025-01-02 PROCEDURE — 84075 ASSAY ALKALINE PHOSPHATASE: CPT | Performed by: NURSE PRACTITIONER

## 2025-01-02 PROCEDURE — 74176 CT ABD & PELVIS W/O CONTRAST: CPT | Mod: FOREIGN READ | Performed by: RADIOLOGY

## 2025-01-02 PROCEDURE — 93005 ELECTROCARDIOGRAM TRACING: CPT

## 2025-01-02 PROCEDURE — 84484 ASSAY OF TROPONIN QUANT: CPT | Performed by: NURSE PRACTITIONER

## 2025-01-02 PROCEDURE — 36415 COLL VENOUS BLD VENIPUNCTURE: CPT | Performed by: NURSE PRACTITIONER

## 2025-01-02 PROCEDURE — 87086 URINE CULTURE/COLONY COUNT: CPT | Mod: AHULAB | Performed by: NURSE PRACTITIONER

## 2025-01-02 PROCEDURE — 99285 EMERGENCY DEPT VISIT HI MDM: CPT | Mod: 25

## 2025-01-02 RX ORDER — CEPHALEXIN 500 MG/1
500 CAPSULE ORAL ONCE
Status: COMPLETED | OUTPATIENT
Start: 2025-01-02 | End: 2025-01-02

## 2025-01-02 RX ORDER — CEPHALEXIN 500 MG/1
500 CAPSULE ORAL 4 TIMES DAILY
Qty: 40 CAPSULE | Refills: 0 | Status: SHIPPED | OUTPATIENT
Start: 2025-01-02 | End: 2025-01-12

## 2025-01-02 RX ORDER — POTASSIUM CHLORIDE 1.5 G/1.58G
20 POWDER, FOR SOLUTION ORAL ONCE
Status: COMPLETED | OUTPATIENT
Start: 2025-01-02 | End: 2025-01-02

## 2025-01-02 RX ADMIN — POTASSIUM CHLORIDE 20 MEQ: 1.5 POWDER, FOR SOLUTION ORAL at 14:08

## 2025-01-02 RX ADMIN — CEPHALEXIN 500 MG: 500 CAPSULE ORAL at 14:08

## 2025-01-02 ASSESSMENT — COLUMBIA-SUICIDE SEVERITY RATING SCALE - C-SSRS
1. IN THE PAST MONTH, HAVE YOU WISHED YOU WERE DEAD OR WISHED YOU COULD GO TO SLEEP AND NOT WAKE UP?: NO
6. HAVE YOU EVER DONE ANYTHING, STARTED TO DO ANYTHING, OR PREPARED TO DO ANYTHING TO END YOUR LIFE?: NO
2. HAVE YOU ACTUALLY HAD ANY THOUGHTS OF KILLING YOURSELF?: NO

## 2025-01-02 ASSESSMENT — PAIN SCALES - GENERAL: PAINLEVEL_OUTOF10: 8

## 2025-01-02 ASSESSMENT — PAIN - FUNCTIONAL ASSESSMENT: PAIN_FUNCTIONAL_ASSESSMENT: 0-10

## 2025-01-02 ASSESSMENT — PAIN DESCRIPTION - LOCATION: LOCATION: ABDOMEN

## 2025-01-02 ASSESSMENT — PAIN DESCRIPTION - ORIENTATION: ORIENTATION: RIGHT;LOWER

## 2025-01-02 ASSESSMENT — PAIN DESCRIPTION - FREQUENCY: FREQUENCY: INTERMITTENT

## 2025-01-02 ASSESSMENT — PAIN DESCRIPTION - DESCRIPTORS: DESCRIPTORS: SHARP

## 2025-01-02 NOTE — ED TRIAGE NOTES
PT is A/Ox4 coming in for worsening UTI symptoms. PT stated that she is taking an antibiotic and  that she started to  have chills and noticed blood in the urin. PT stated that she has been getting sharp pains in the right lower ABD.

## 2025-01-03 LAB — BACTERIA UR CULT: NORMAL

## 2025-01-04 ENCOUNTER — TELEPHONE (OUTPATIENT)
Dept: POST ACUTE CARE | Facility: EXTERNAL LOCATION | Age: 80
End: 2025-01-04
Payer: MEDICARE

## 2025-01-04 DIAGNOSIS — N39.0 ACUTE URINARY TRACT INFECTION: Primary | ICD-10-CM

## 2025-01-04 RX ORDER — METHYLPREDNISOLONE 4 MG/1
TABLET ORAL
Qty: 21 TABLET | Refills: 0 | Status: SHIPPED | OUTPATIENT
Start: 2025-01-04 | End: 2025-01-10

## 2025-01-04 RX ORDER — CIPROFLOXACIN 500 MG/1
500 TABLET ORAL 2 TIMES DAILY
Qty: 10 TABLET | Refills: 0 | Status: SHIPPED | OUTPATIENT
Start: 2025-01-04 | End: 2025-01-09

## 2025-01-10 LAB
ATRIAL RATE: 69 BPM
P AXIS: 65 DEGREES
P OFFSET: 208 MS
P ONSET: 149 MS
PR INTERVAL: 150 MS
Q ONSET: 224 MS
QRS COUNT: 12 BEATS
QRS DURATION: 96 MS
QT INTERVAL: 398 MS
QTC CALCULATION(BAZETT): 426 MS
QTC FREDERICIA: 417 MS
R AXIS: 62 DEGREES
T AXIS: 66 DEGREES
T OFFSET: 423 MS
VENTRICULAR RATE: 69 BPM

## 2025-01-21 ENCOUNTER — LAB (OUTPATIENT)
Dept: LAB | Facility: LAB | Age: 80
End: 2025-01-21
Payer: MEDICARE

## 2025-01-21 ENCOUNTER — APPOINTMENT (OUTPATIENT)
Dept: PRIMARY CARE | Facility: CLINIC | Age: 80
End: 2025-01-21
Payer: MEDICARE

## 2025-01-21 VITALS
WEIGHT: 169 LBS | DIASTOLIC BLOOD PRESSURE: 72 MMHG | HEIGHT: 66 IN | BODY MASS INDEX: 27.16 KG/M2 | SYSTOLIC BLOOD PRESSURE: 112 MMHG

## 2025-01-21 DIAGNOSIS — Z79.899 LONG TERM USE OF DRUG: ICD-10-CM

## 2025-01-21 DIAGNOSIS — F41.9 ANXIETY: ICD-10-CM

## 2025-01-21 DIAGNOSIS — Z00.00 MEDICARE ANNUAL WELLNESS VISIT, SUBSEQUENT: ICD-10-CM

## 2025-01-21 DIAGNOSIS — Z00.00 ROUTINE GENERAL MEDICAL EXAMINATION AT HEALTH CARE FACILITY: Primary | ICD-10-CM

## 2025-01-21 LAB
AMPHETAMINES UR QL SCN: NORMAL
BARBITURATES UR QL SCN: NORMAL
BZE UR QL SCN: NORMAL
CANNABINOIDS UR QL SCN: NORMAL
CREAT UR-MCNC: 186.8 MG/DL (ref 20–320)
PCP UR QL SCN: NORMAL

## 2025-01-21 PROCEDURE — 82570 ASSAY OF URINE CREATININE: CPT

## 2025-01-21 PROCEDURE — 80361 OPIATES 1 OR MORE: CPT

## 2025-01-21 PROCEDURE — 80368 SEDATIVE HYPNOTICS: CPT

## 2025-01-21 PROCEDURE — 1170F FXNL STATUS ASSESSED: CPT | Performed by: STUDENT IN AN ORGANIZED HEALTH CARE EDUCATION/TRAINING PROGRAM

## 2025-01-21 PROCEDURE — G0439 PPPS, SUBSEQ VISIT: HCPCS | Performed by: STUDENT IN AN ORGANIZED HEALTH CARE EDUCATION/TRAINING PROGRAM

## 2025-01-21 PROCEDURE — 1158F ADVNC CARE PLAN TLK DOCD: CPT | Performed by: STUDENT IN AN ORGANIZED HEALTH CARE EDUCATION/TRAINING PROGRAM

## 2025-01-21 PROCEDURE — 3074F SYST BP LT 130 MM HG: CPT | Performed by: STUDENT IN AN ORGANIZED HEALTH CARE EDUCATION/TRAINING PROGRAM

## 2025-01-21 PROCEDURE — 99213 OFFICE O/P EST LOW 20 MIN: CPT | Performed by: STUDENT IN AN ORGANIZED HEALTH CARE EDUCATION/TRAINING PROGRAM

## 2025-01-21 PROCEDURE — 1123F ACP DISCUSS/DSCN MKR DOCD: CPT | Performed by: STUDENT IN AN ORGANIZED HEALTH CARE EDUCATION/TRAINING PROGRAM

## 2025-01-21 PROCEDURE — 80346 BENZODIAZEPINES1-12: CPT

## 2025-01-21 PROCEDURE — 80354 DRUG SCREENING FENTANYL: CPT

## 2025-01-21 PROCEDURE — 3078F DIAST BP <80 MM HG: CPT | Performed by: STUDENT IN AN ORGANIZED HEALTH CARE EDUCATION/TRAINING PROGRAM

## 2025-01-21 PROCEDURE — 80307 DRUG TEST PRSMV CHEM ANLYZR: CPT

## 2025-01-21 PROCEDURE — 80365 DRUG SCREENING OXYCODONE: CPT

## 2025-01-21 PROCEDURE — 1159F MED LIST DOCD IN RCRD: CPT | Performed by: STUDENT IN AN ORGANIZED HEALTH CARE EDUCATION/TRAINING PROGRAM

## 2025-01-21 PROCEDURE — 80373 DRUG SCREENING TRAMADOL: CPT

## 2025-01-21 PROCEDURE — 1160F RVW MEDS BY RX/DR IN RCRD: CPT | Performed by: STUDENT IN AN ORGANIZED HEALTH CARE EDUCATION/TRAINING PROGRAM

## 2025-01-21 PROCEDURE — 1036F TOBACCO NON-USER: CPT | Performed by: STUDENT IN AN ORGANIZED HEALTH CARE EDUCATION/TRAINING PROGRAM

## 2025-01-21 PROCEDURE — 80358 DRUG SCREENING METHADONE: CPT

## 2025-01-21 RX ORDER — LORAZEPAM 1 MG/1
1 TABLET ORAL EVERY 8 HOURS PRN
Qty: 270 TABLET | Refills: 0 | Status: SHIPPED | OUTPATIENT
Start: 2025-01-21

## 2025-01-21 ASSESSMENT — ACTIVITIES OF DAILY LIVING (ADL)
DRESSING: INDEPENDENT
BATHING: INDEPENDENT
TAKING_MEDICATION: INDEPENDENT
GROCERY_SHOPPING: INDEPENDENT
DOING_HOUSEWORK: INDEPENDENT
MANAGING_FINANCES: INDEPENDENT

## 2025-01-21 ASSESSMENT — ENCOUNTER SYMPTOMS
DEPRESSION: 0
LOSS OF SENSATION IN FEET: 0
OCCASIONAL FEELINGS OF UNSTEADINESS: 0

## 2025-01-21 NOTE — PROGRESS NOTES
"Subjective   Patient ID: Jasmine Price is a 79 y.o. female who presents for Medicare Annual Wellness Visit Subsequent.    HPI   Patient presenting for general wellness visit. She mentions that since her last visit, she had some dysuria and was initially treated with macrobid with no improvement. She went to the ED and antibiotics were changed to keflex and she had a rash soon after taking the medication. She then was changed to ciprofloxacin with improvement and resolution of symptoms. She mentions that she has been feeling well. Denies any worsening of her anxiety. She mentions that her anxiety has improved since her  passed as she is no longer worrying as much about his health. She mentions that she needs to take her lorazapam 2-2.5x dailly.     Review of Systems    Objective   /72   Ht 1.676 m (5' 6\")   Wt 76.7 kg (169 lb)   BMI 27.28 kg/m²     Physical Exam  GEN: conversant, NAD  HEENT: PERRL, EOMI, MMM OP clear, TMs clear bilaterally  NECK: supple, no cervical LAD, no carotid bruits  CV: S1, S2, regular, no murmur  PULM: CTAB  ABD: soft, NT, ND, BS+  EXT: no LE edema  NEURO: no gross focal deficits  PSYCH: appropriate affect       Assessment/Plan   Anxiety  -Continue lorazepam 1 mg to 3 times daily as needed, at times will take twice a day. Did discuss weaning back to twice a day in the future  -Discussed side effect profile states that she is not experiencing any  -OARRS reviewed, no concerning behavior  -Drug contract and urine screen 10/2024     Occasional wheezing at nighttime  -Discussed trial of albuterol inhaler, not overly bothersome at this time and will plan to monitor.     CAD/DLD/HTN  -Follows with cardiology   -Continue aspirin and statin  -Metoprolol was changed to amlodipine   -Stress test 11/2022 that was normal      Bladder prolapse  -Follows with gynecology     Health maintenance  -Signed DNAR-CCA paperwork, has healthcare power of  designated   -Mammogram " 9/2023  -Pneumovax 2014  -Received Shingrix  -Tdap 2021   -Discussed getting RSV and Covid vaccine      RTC 3 months        Michael Damico MD  Internal Medicine PGY3  Please message me with any questions

## 2025-01-23 ENCOUNTER — OFFICE VISIT (OUTPATIENT)
Dept: CARDIOLOGY | Facility: HOSPITAL | Age: 80
End: 2025-01-23
Payer: MEDICARE

## 2025-01-23 VITALS
OXYGEN SATURATION: 95 % | RESPIRATION RATE: 17 BRPM | HEIGHT: 66 IN | SYSTOLIC BLOOD PRESSURE: 106 MMHG | HEART RATE: 71 BPM | DIASTOLIC BLOOD PRESSURE: 68 MMHG | WEIGHT: 166.2 LBS | BODY MASS INDEX: 26.71 KG/M2

## 2025-01-23 DIAGNOSIS — I10 ESSENTIAL HYPERTENSION: Primary | ICD-10-CM

## 2025-01-23 DIAGNOSIS — E78.00 PURE HYPERCHOLESTEROLEMIA: ICD-10-CM

## 2025-01-23 DIAGNOSIS — I25.10 CORONARY ARTERY DISEASE INVOLVING NATIVE CORONARY ARTERY OF NATIVE HEART WITHOUT ANGINA PECTORIS: ICD-10-CM

## 2025-01-23 PROCEDURE — 3074F SYST BP LT 130 MM HG: CPT | Performed by: NURSE PRACTITIONER

## 2025-01-23 PROCEDURE — 3078F DIAST BP <80 MM HG: CPT | Performed by: NURSE PRACTITIONER

## 2025-01-23 PROCEDURE — 1036F TOBACCO NON-USER: CPT | Performed by: NURSE PRACTITIONER

## 2025-01-23 PROCEDURE — 99214 OFFICE O/P EST MOD 30 MIN: CPT | Performed by: NURSE PRACTITIONER

## 2025-01-23 PROCEDURE — 1159F MED LIST DOCD IN RCRD: CPT | Performed by: NURSE PRACTITIONER

## 2025-01-23 PROCEDURE — 1123F ACP DISCUSS/DSCN MKR DOCD: CPT | Performed by: NURSE PRACTITIONER

## 2025-01-23 PROCEDURE — G2211 COMPLEX E/M VISIT ADD ON: HCPCS | Performed by: NURSE PRACTITIONER

## 2025-01-23 RX ORDER — AMLODIPINE BESYLATE 5 MG/1
5 TABLET ORAL DAILY
COMMUNITY

## 2025-01-23 RX ORDER — AMLODIPINE BESYLATE 5 MG/1
5 TABLET ORAL DAILY
Qty: 90 TABLET | Refills: 3 | Status: CANCELLED | OUTPATIENT
Start: 2025-01-23 | End: 2026-01-23

## 2025-01-23 NOTE — PATIENT INSTRUCTIONS
Continue current meds  Follow up in August with Dr. Posadas  Increase physical activity  Check fasting lipid panel

## 2025-01-23 NOTE — PROGRESS NOTES
Primary Care Physician: Darryl Stout MD  Date of Visit: 01/23/2025 11:20 AM EST  Location of visit: [facility]     Chief Complaint:   Chief Complaint   Patient presents with    Follow-up        HPI / Summary:   Jasmine Price is a 79 y.o. female presents for followup. Seen in collaboration with Dr. Posadas. Her dyspnea on exertion has very much improved since stopping Metoprolol. She has been able to walk longer distance with mild dyspnea. She has not had any chest pain.  The patient denies chest pain, palpitations, lightheadedness, syncope, orthopnea, paroxysmal nocturnal dyspnea, lower extremity edema, or bleeding problems.                Past Medical History:  Past Medical History:   Diagnosis Date    Generalized anxiety disorder 04/15/2015    Generalized anxiety disorder    Other specified postprocedural states     H/O colonoscopy    Personal history of other medical treatment     H/O bone density study    Personal history of other medical treatment     History of mammogram    Vitamin D deficiency, unspecified     Vitamin D deficiency        Past Surgical History:  No past surgical history on file.       Social History:   reports that she has quit smoking. Her smoking use included cigarettes. She has quit using smokeless tobacco. She reports current alcohol use. She reports that she does not use drugs.     Family History:  family history is not on file.      Allergies:  Allergies   Allergen Reactions    Keflex [Cephalexin] Unknown    Bacitracin Rash       Outpatient Medications:  Current Outpatient Medications   Medication Instructions    amLODIPine (NORVASC) 5 mg, Daily    aspirin 81 mg capsule 1 tablet, Daily    atorvastatin (LIPITOR) 40 mg, oral, Daily    diclofenac sodium (VOLTAREN) 4 g, Topical, 4 times daily    LORazepam (ATIVAN) 1 mg, oral, Every 8 hours PRN    melatonin 10 mg tablet Nightly    phenazopyridine (URINARY PAIN RELIEF) 95 mg, 3 times daily PRN       Physical Exam:  Vitals:     "01/23/25 1115   BP: 106/68   BP Location: Left arm   Patient Position: Sitting   BP Cuff Size: Adult   Pulse: 71   Resp: 17   SpO2: 95%   Weight: 75.4 kg (166 lb 3.2 oz)   Height: 1.676 m (5' 6\")     Wt Readings from Last 5 Encounters:   01/23/25 75.4 kg (166 lb 3.2 oz)   01/21/25 76.7 kg (169 lb)   01/02/25 72.1 kg (159 lb)   10/18/24 76.9 kg (169 lb 8 oz)   09/05/24 75.8 kg (167 lb)     Body mass index is 26.83 kg/m².   GENERAL: alert, cooperative, pleasant, in no acute distress  SKIN: warm and dry  NECK: Normal JVD, negative HJR  CARDIAC: Regular rate and rhythm with no rubs, murmurs, or gallops  CHEST: Normal respiratory efforts, lungs clear to auscultation bilaterally.  ABDOMEN: soft, nontender, nondistended  EXTREMITIES: no edema, +2 palpable RP and PT pulses bilaterally       Last Labs:  Recent Labs     01/02/25  1241 01/25/24  1020 10/27/22  0935   WBC 9.9 7.3 6.8   HGB 13.5 13.9 13.1   HCT 40.9 44.0 40.7    224 204   MCV 91 98 95     Recent Labs     01/02/25  1241 01/25/24  1020 10/27/22  0935   * 139 142   K 3.3* 4.7 4.5   CL 99 105 107   BUN 19 23 18   CREATININE 0.93 0.83 0.81     CMP -  Lab Results   Component Value Date    CALCIUM 8.5 (L) 01/02/2025    PROT 6.6 01/02/2025    ALBUMIN 3.8 01/02/2025    AST 16 01/02/2025    ALT 13 01/02/2025    ALKPHOS 60 01/02/2025    BILITOT 0.6 01/02/2025       LIPID PANEL -   Lab Results   Component Value Date    CHOL 128 01/25/2024    HDL 47.0 01/25/2024    LDLF 53 10/27/2022    TRIG 154 (H) 01/25/2024   LDL 50 1/25/24    Lab Results   Component Value Date    BNP 71 04/23/2024       Last Cardiology Tests:  ECG:  Reviewed EKG from 1/2/25- normal sinus rhythm with PACs HR 69 non specific ST abnormality    Echo:  Echocardiogram November 3, 2022  CONCLUSIONS:   1. Left ventricular systolic function is normal with a 60-65% estimated ejection fraction.   2. Spectral Doppler shows an impaired relaxation pattern of left ventricular diastolic filling.      "   Cath:        Stress Test:  Exercise nuclear stress test November 3, 2022  The patient exercised to stage II on a Half Sonido protocol for 9 minutes and 00 seconds, achieving 7 METS. Patient received a total of 32.0 mCi of Myoview at 1:55:00 PM. The peak heart rate achieved was 133 bpm, which was 94 % of the age predicted target heart rate of 142 bpm.   Summary:  1. No clinical or electrocardiographic evidence for ischemia at a maximal workload.  2. Nuclear image results are reported separately.  3. The adequate level of stress was achieved.   IMPRESSION:  1.  Normal myocardial perfusion study without evidence of ischemia or  prior infarction.  2. The left ventricle is normal in size.  3. Normal LV wall motion with an LV EF estimated at greater than 65%.     1/2/25 Abdominal aortic ultrasound  Vascular: Aorta and common iliac arteries normal in caliber.      Cardiac Imaging:    Assessment/Plan   Jasmine was seen today for follow-up.  Diagnoses and all orders for this visit:  Essential hypertension (Primary)  Pure hypercholesterolemia  -     Lipid panel; Future  Coronary artery disease involving native coronary artery of native heart without angina pectoris        In summary Ms. Price is a pleasant 79 year-old white female with a past medical history significant for coronary artery disease status post PCI in the setting of a myocardial infarction in 2004, hypertension, and hyperlipidemia. Her dyspnea on exertion has overall improved since stopping Metoprolol.    Her blood pressure is controlled. I have ordered fasting lipid panel. I have encouraged her to increase physical activity. She should continue her other cardiovascular medications.  We will see her back in follow-up in 6 months.       Orders:  No orders of the defined types were placed in this encounter.     Followup Appts:  Future Appointments   Date Time Provider Department Center   4/22/2025 12:15 PM Darryl Stout MD [id number] [place]   8/6/2025 11:20 AM  Garrett Posadas MD [id number] [place]           ____________________________________________________________  MATTHEW Singh-CNP  [facility]

## 2025-01-24 LAB
1OH-MIDAZOLAM UR CFM-MCNC: <25 NG/ML
6MAM UR CFM-MCNC: <25 NG/ML
7AMINOCLONAZEPAM UR CFM-MCNC: <25 NG/ML
A-OH ALPRAZ UR CFM-MCNC: <25 NG/ML
ALPRAZ UR CFM-MCNC: <25 NG/ML
CHLORDIAZEP UR CFM-MCNC: <25 NG/ML
CLONAZEPAM UR CFM-MCNC: <25 NG/ML
CODEINE UR CFM-MCNC: <50 NG/ML
DIAZEPAM UR CFM-MCNC: <25 NG/ML
EDDP UR CFM-MCNC: <25 NG/ML
FENTANYL UR CFM-MCNC: <2.5 NG/ML
HYDROCODONE CTO UR CFM-MCNC: <25 NG/ML
HYDROMORPHONE UR CFM-MCNC: <25 NG/ML
LORAZEPAM UR CFM-MCNC: >1000 NG/ML
METHADONE UR CFM-MCNC: <25 NG/ML
MIDAZOLAM UR CFM-MCNC: <25 NG/ML
MORPHINE UR CFM-MCNC: <50 NG/ML
NORDIAZEPAM UR CFM-MCNC: <25 NG/ML
NORFENTANYL UR CFM-MCNC: <2.5 NG/ML
NORHYDROCODONE UR CFM-MCNC: <25 NG/ML
NOROXYCODONE UR CFM-MCNC: <25 NG/ML
NORTRAMADOL UR-MCNC: <50 NG/ML
OXAZEPAM UR CFM-MCNC: <25 NG/ML
OXYCODONE UR CFM-MCNC: <25 NG/ML
OXYMORPHONE UR CFM-MCNC: <25 NG/ML
TEMAZEPAM UR CFM-MCNC: <25 NG/ML
TRAMADOL UR CFM-MCNC: <50 NG/ML
ZOLPIDEM UR CFM-MCNC: <25 NG/ML
ZOLPIDEM UR-MCNC: <25 NG/ML

## 2025-01-28 LAB
CHOLEST SERPL-MCNC: 148 MG/DL
CHOLEST/HDLC SERPL: 2.5 (CALC)
HDLC SERPL-MCNC: 59 MG/DL
LDLC SERPL CALC-MCNC: 69 MG/DL (CALC)
NONHDLC SERPL-MCNC: 89 MG/DL (CALC)
TRIGL SERPL-MCNC: 116 MG/DL

## 2025-01-29 ENCOUNTER — TELEPHONE (OUTPATIENT)
Dept: CARDIOLOGY | Facility: HOSPITAL | Age: 80
End: 2025-01-29
Payer: MEDICARE

## 2025-01-29 DIAGNOSIS — I10 PRIMARY HYPERTENSION: Primary | ICD-10-CM

## 2025-01-29 RX ORDER — AMLODIPINE BESYLATE 5 MG/1
5 TABLET ORAL DAILY
Qty: 90 TABLET | Refills: 3 | Status: SHIPPED | OUTPATIENT
Start: 2025-01-29 | End: 2026-01-29

## 2025-01-29 NOTE — TELEPHONE ENCOUNTER
Patient was here last week and it looks like her amlodipine was accidentally discontinued in her chart? She is still taking the 5 mg tablet once daily but now needs a refill because when it was crossed out in our system it sent a discontinue to the pharmacy. She would like it to be sent to [facility] in [place] please. Thank you.

## 2025-01-29 NOTE — RESULT ENCOUNTER NOTE
Left message for patient to call back or respond on my chart. Lipid panel not at goal. LDL goal less than 55. Recommend increase Atorvastatin to 80 mg daily and repeat lipid panel in 3 months if patient agreeable.

## 2025-01-29 NOTE — RESULT ENCOUNTER NOTE
Spoke to patient on phone. She is hesitant to increase Atorvastatin to 80 mg daily. She would like to modify diet- and repeat lipid panel at next appt. She understands LDL goal is less than 55 for cardiovascular risk reduction.

## 2025-02-11 DIAGNOSIS — Z86.39 HISTORY OF MIXED HYPERLIPIDEMIA: ICD-10-CM

## 2025-02-11 RX ORDER — ATORVASTATIN CALCIUM 40 MG/1
40 TABLET, FILM COATED ORAL DAILY
Qty: 90 TABLET | Refills: 3 | Status: SHIPPED | OUTPATIENT
Start: 2025-02-11

## 2025-04-07 ENCOUNTER — OFFICE VISIT (OUTPATIENT)
Dept: URGENT CARE | Age: 80
End: 2025-04-07
Payer: MEDICARE

## 2025-04-07 VITALS — HEART RATE: 68 BPM | OXYGEN SATURATION: 96 % | TEMPERATURE: 98.2 F

## 2025-04-07 DIAGNOSIS — J40 BRONCHITIS: Primary | ICD-10-CM

## 2025-04-07 PROCEDURE — 1159F MED LIST DOCD IN RCRD: CPT | Performed by: NURSE PRACTITIONER

## 2025-04-07 PROCEDURE — 1036F TOBACCO NON-USER: CPT | Performed by: NURSE PRACTITIONER

## 2025-04-07 PROCEDURE — 99203 OFFICE O/P NEW LOW 30 MIN: CPT | Performed by: NURSE PRACTITIONER

## 2025-04-07 PROCEDURE — 1123F ACP DISCUSS/DSCN MKR DOCD: CPT | Performed by: NURSE PRACTITIONER

## 2025-04-07 RX ORDER — AZITHROMYCIN 250 MG/1
TABLET, FILM COATED ORAL
Qty: 6 TABLET | Refills: 0 | Status: SHIPPED | OUTPATIENT
Start: 2025-04-07 | End: 2025-04-12

## 2025-04-07 RX ORDER — BENZONATATE 200 MG/1
200 CAPSULE ORAL 3 TIMES DAILY PRN
Qty: 42 CAPSULE | Refills: 0 | Status: SHIPPED | OUTPATIENT
Start: 2025-04-07 | End: 2025-05-07

## 2025-04-07 RX ORDER — ALBUTEROL SULFATE 90 UG/1
2 INHALANT RESPIRATORY (INHALATION) EVERY 4 HOURS PRN
Qty: 8.5 G | Refills: 0 | Status: SHIPPED | OUTPATIENT
Start: 2025-04-07 | End: 2026-04-07

## 2025-04-07 NOTE — PROGRESS NOTES
Subjective   Patient ID: Jasmine Price is a 80 y.o. female. They present today with a chief complaint of Cough (Chest congestion for 7 days).    History of Present Illness  80-year-old female coming in with complaints of chest congestion and cough for the last 7 days.  She states everything started with sore throat which she had for a couple of days but now that is gone.  She denies any fevers or chills.  She states that night the cough seems to be worse.  She denies any shortness of breath.  She denies any other complaints today.    Past Medical History  Allergies as of 04/07/2025 - Reviewed 04/07/2025   Allergen Reaction Noted    Keflex [cephalexin] Unknown 01/21/2025    Bacitracin Rash 11/17/2016       (Not in a hospital admission)       Past Medical History:   Diagnosis Date    Generalized anxiety disorder 04/15/2015    Generalized anxiety disorder    Other specified postprocedural states     H/O colonoscopy    Personal history of other medical treatment     H/O bone density study    Personal history of other medical treatment     History of mammogram    Vitamin D deficiency, unspecified     Vitamin D deficiency       History reviewed. No pertinent surgical history.     reports that she has quit smoking. Her smoking use included cigarettes. She has quit using smokeless tobacco. She reports current alcohol use. She reports that she does not use drugs.    Review of Systems  Review of Systems:  General: No weight loss, fatigue, anorexia, insomnia, fever, chills.  Cardiac: No chest pain, palpitations, syncope, near syncope.  Pulmonary:  No shortness of breath, positive cough, no hemoptysis  Heme/lymph: No swollen glands, fever, bleeding  Musculoskeletal: No limb pain, joint pain, joint swelling.  Skin: No rashes  Neuro: No numbness, tingling, headaches                                 Objective    Vitals:    04/07/25 1134   Pulse: 68   Temp: 36.8 °C (98.2 °F)   SpO2: 96%     No LMP recorded.    Physical  Exam  Physical Exam:  General: Vital noted, no distress. Afebrile  Cardiac: Regular rate and rhythm, no murmur  Pulmonary: Lungs clear bilaterally with good aeration. No adventitious breath sounds.  Skin: No rashes    Procedures    Point of Care Test & Imaging Results from this visit  No results found for this visit on 04/07/25.   Imaging  No results found.    Cardiology, Vascular, and Other Imaging  No other imaging results found for the past 2 days      Diagnostic study results (if any) were reviewed by WANDA Wall.    Assessment/Plan   Allergies, medications, history, and pertinent labs/EKGs/Imaging reviewed by WANDA Wall.     Medical Decision Making  Treatment: zithromax, benzonatate, and albuterol prescribed  Differential: 1) bronchitis , 2) uri , 3) pneumonia   Plan: Patient will follow up with the PCP in the next 2-3 days. Return for any worsening symptoms or go to the ER for further evaluation. Patient understands return precautions and discharge insturctions.  Impression:   1) bronchitis      Orders and Diagnoses  There are no diagnoses linked to this encounter.    Medical Admin Record      Patient disposition: Home    Electronically signed by WANDA Wall  11:50 AM

## 2025-04-22 ENCOUNTER — APPOINTMENT (OUTPATIENT)
Dept: PRIMARY CARE | Facility: CLINIC | Age: 80
End: 2025-04-22
Payer: MEDICARE

## 2025-04-22 VITALS
WEIGHT: 169 LBS | BODY MASS INDEX: 27.28 KG/M2 | HEART RATE: 66 BPM | SYSTOLIC BLOOD PRESSURE: 135 MMHG | OXYGEN SATURATION: 98 % | DIASTOLIC BLOOD PRESSURE: 82 MMHG

## 2025-04-22 DIAGNOSIS — F41.9 ANXIETY: ICD-10-CM

## 2025-04-22 PROCEDURE — 1123F ACP DISCUSS/DSCN MKR DOCD: CPT | Performed by: STUDENT IN AN ORGANIZED HEALTH CARE EDUCATION/TRAINING PROGRAM

## 2025-04-22 PROCEDURE — 99213 OFFICE O/P EST LOW 20 MIN: CPT | Performed by: STUDENT IN AN ORGANIZED HEALTH CARE EDUCATION/TRAINING PROGRAM

## 2025-04-22 PROCEDURE — 1036F TOBACCO NON-USER: CPT | Performed by: STUDENT IN AN ORGANIZED HEALTH CARE EDUCATION/TRAINING PROGRAM

## 2025-04-22 PROCEDURE — G2211 COMPLEX E/M VISIT ADD ON: HCPCS | Performed by: STUDENT IN AN ORGANIZED HEALTH CARE EDUCATION/TRAINING PROGRAM

## 2025-04-22 PROCEDURE — 1158F ADVNC CARE PLAN TLK DOCD: CPT | Performed by: STUDENT IN AN ORGANIZED HEALTH CARE EDUCATION/TRAINING PROGRAM

## 2025-04-22 PROCEDURE — 3075F SYST BP GE 130 - 139MM HG: CPT | Performed by: STUDENT IN AN ORGANIZED HEALTH CARE EDUCATION/TRAINING PROGRAM

## 2025-04-22 PROCEDURE — 3079F DIAST BP 80-89 MM HG: CPT | Performed by: STUDENT IN AN ORGANIZED HEALTH CARE EDUCATION/TRAINING PROGRAM

## 2025-04-22 PROCEDURE — 1159F MED LIST DOCD IN RCRD: CPT | Performed by: STUDENT IN AN ORGANIZED HEALTH CARE EDUCATION/TRAINING PROGRAM

## 2025-04-22 PROCEDURE — 1160F RVW MEDS BY RX/DR IN RCRD: CPT | Performed by: STUDENT IN AN ORGANIZED HEALTH CARE EDUCATION/TRAINING PROGRAM

## 2025-04-22 RX ORDER — LORAZEPAM 1 MG/1
1 TABLET ORAL EVERY 8 HOURS PRN
Qty: 270 TABLET | Refills: 0 | Status: SHIPPED | OUTPATIENT
Start: 2025-04-22

## 2025-04-22 NOTE — PROGRESS NOTES
Subjective   Patient ID: Jasmine Price is a 80 y.o. female who presents for Follow-up (3 mo).    HPI     Presents for follow-up and medication refill. Reports has been doing well. Will have some burning leg pain after standing for a period of time. Considering trip to [place] this summer. Recent URI    Review of Systems    8 point review of systems is otherwise negative unless mentioned on HPI      Objective   /82 (BP Location: Left arm, Patient Position: Sitting)   Pulse 66   Wt 76.7 kg (169 lb)   SpO2 98%   BMI 27.28 kg/m²     Physical Exam    General: No acute distress  HEENT: EOMI  CV: Regular rate and rhythm, normal S1 and S2, no murmurs  Pulm: Clear to auscultation bilaterally, no wheezings, rales or rhonchi  Abd: Nondistended  MSK: 5/5 strength in all extremities  Skin: No rashes   Lymphatic: No lymphadenopathy      Assessment/Plan       Anxiety  -Continue lorazepam 1 mg to 3 times daily as needed, at times will take twice a day. Did discuss weaning back to twice a day in the future  -Discussed side effect profile states that she is not experiencing any  -OARRS reviewed, no concerning behavior  -UDS 1/2025, CSA 4/2025     Occasional wheezing at nighttime  -Discussed trial of albuterol inhaler, not overly bothersome at this time and will plan to monitor.     CAD/DLD/HTN  -Follows with cardiology   -Continue aspirin and statin  -Metoprolol was changed to amlodipine   -Stress test 11/2022 that was normal      Bladder prolapse  -Follows with gynecology     Health maintenance  -Signed DNAR-CCA paperwork, has healthcare power of  designated   -Mammogram 9/2023  -Pneumovax 2014  -Received Shingrix  -Tdap 2021   -Discussed getting RSV and Covid vaccine      RTC 3 months

## 2025-04-29 ENCOUNTER — APPOINTMENT (OUTPATIENT)
Dept: PRIMARY CARE | Facility: CLINIC | Age: 80
End: 2025-04-29
Payer: MEDICARE

## 2025-05-02 LAB
CHOLEST SERPL-MCNC: 140 MG/DL
CHOLEST/HDLC SERPL: 2.4 (CALC)
HDLC SERPL-MCNC: 58 MG/DL
LDLC SERPL CALC-MCNC: 64 MG/DL (CALC)
NONHDLC SERPL-MCNC: 82 MG/DL (CALC)
TRIGL SERPL-MCNC: 99 MG/DL

## 2025-05-05 ENCOUNTER — TELEPHONE (OUTPATIENT)
Dept: CARDIOLOGY | Facility: HOSPITAL | Age: 80
End: 2025-05-05
Payer: MEDICARE

## 2025-05-05 DIAGNOSIS — I25.10 CORONARY ARTERY DISEASE INVOLVING NATIVE CORONARY ARTERY OF NATIVE HEART WITHOUT ANGINA PECTORIS: ICD-10-CM

## 2025-05-05 DIAGNOSIS — E78.00 PURE HYPERCHOLESTEROLEMIA: Primary | ICD-10-CM

## 2025-07-22 DIAGNOSIS — I25.10 CORONARY ARTERY DISEASE INVOLVING NATIVE CORONARY ARTERY OF NATIVE HEART WITHOUT ANGINA PECTORIS: Primary | ICD-10-CM

## 2025-07-22 DIAGNOSIS — E78.00 PURE HYPERCHOLESTEROLEMIA: ICD-10-CM

## 2025-07-23 LAB
CHOLEST SERPL-MCNC: 129 MG/DL
CHOLEST/HDLC SERPL: 2.4 (CALC)
HDLC SERPL-MCNC: 54 MG/DL
LDLC SERPL CALC-MCNC: 55 MG/DL (CALC)
NONHDLC SERPL-MCNC: 75 MG/DL (CALC)
TRIGL SERPL-MCNC: 123 MG/DL

## 2025-07-30 ENCOUNTER — OFFICE VISIT (OUTPATIENT)
Dept: CARDIOLOGY | Facility: HOSPITAL | Age: 80
End: 2025-07-30
Payer: MEDICARE

## 2025-07-30 VITALS
OXYGEN SATURATION: 96 % | BODY MASS INDEX: 27.21 KG/M2 | RESPIRATION RATE: 18 BRPM | SYSTOLIC BLOOD PRESSURE: 104 MMHG | HEART RATE: 70 BPM | DIASTOLIC BLOOD PRESSURE: 70 MMHG | HEIGHT: 66 IN | WEIGHT: 169.3 LBS

## 2025-07-30 DIAGNOSIS — E78.00 PURE HYPERCHOLESTEROLEMIA: ICD-10-CM

## 2025-07-30 DIAGNOSIS — I25.10 CORONARY ARTERY DISEASE INVOLVING NATIVE CORONARY ARTERY OF NATIVE HEART WITHOUT ANGINA PECTORIS: Primary | ICD-10-CM

## 2025-07-30 DIAGNOSIS — I10 PRIMARY HYPERTENSION: ICD-10-CM

## 2025-07-30 DIAGNOSIS — I25.2 HISTORY OF MYOCARDIAL INFARCTION: ICD-10-CM

## 2025-07-30 PROCEDURE — 99214 OFFICE O/P EST MOD 30 MIN: CPT | Performed by: INTERNAL MEDICINE

## 2025-07-30 PROCEDURE — 3078F DIAST BP <80 MM HG: CPT | Performed by: INTERNAL MEDICINE

## 2025-07-30 PROCEDURE — G2211 COMPLEX E/M VISIT ADD ON: HCPCS | Performed by: INTERNAL MEDICINE

## 2025-07-30 PROCEDURE — 1159F MED LIST DOCD IN RCRD: CPT | Performed by: INTERNAL MEDICINE

## 2025-07-30 PROCEDURE — 1160F RVW MEDS BY RX/DR IN RCRD: CPT | Performed by: INTERNAL MEDICINE

## 2025-07-30 PROCEDURE — 99212 OFFICE O/P EST SF 10 MIN: CPT

## 2025-07-30 PROCEDURE — 3074F SYST BP LT 130 MM HG: CPT | Performed by: INTERNAL MEDICINE

## 2025-07-30 NOTE — PROGRESS NOTES
Primary Care Physician: Darryl Stout MD  Date of Visit: 07/30/2025 11:20 AM EDT  Location of visit: [facility]     Chief Complaint:   Chief Complaint   Patient presents with    Follow-up        HPI / Summary:   Jasmine Price is a 80 y.o. female who presents for followup.  She notes improvement in her prior dyspnea on exertion since stopping metoprolol.  She does go for walks occasionally and is able to walk up and down stairs without chest pain or shortness of breath.  The patient denies chest pain,  palpitations, lightheadedness, syncope, orthopnea, paroxysmal nocturnal dyspnea, lower extremity edema, or bleeding problems.          Past Medical History:   Diagnosis Date    Generalized anxiety disorder 04/15/2015    Generalized anxiety disorder    Other specified postprocedural states     H/O colonoscopy    Personal history of other medical treatment     H/O bone density study    Personal history of other medical treatment     History of mammogram    Vitamin D deficiency, unspecified     Vitamin D deficiency        History reviewed. No pertinent surgical history.       Social History:   reports that she has quit smoking. Her smoking use included cigarettes. She has quit using smokeless tobacco. She reports current alcohol use. She reports that she does not use drugs.     Family History:  family history is not on file.      Allergies:  Allergies   Allergen Reactions    Keflex [Cephalexin] Unknown    Bacitracin Rash       Outpatient Medications:  Current Outpatient Medications   Medication Instructions    albuterol (ProAir HFA) 90 mcg/actuation inhaler 2 puffs, inhalation, Every 4 hours PRN    amLODIPine (NORVASC) 5 mg, oral, Daily    aspirin 81 mg capsule 1 tablet, Daily    atorvastatin (LIPITOR) 40 mg, oral, Daily    diclofenac sodium (VOLTAREN) 4 g, Topical, 4 times daily    LORazepam (ATIVAN) 1 mg, oral, Every 8 hours PRN    melatonin 10 mg tablet Nightly    phenazopyridine (URINARY PAIN RELIEF) 95 mg,  "3 times daily PRN       Physical Exam:  Vitals:    07/30/25 1113   BP: 104/70   BP Location: Left arm   Patient Position: Sitting   BP Cuff Size: Adult   Pulse: 70   Resp: 18   SpO2: 96%   Weight: 76.8 kg (169 lb 4.8 oz)   Height: 1.676 m (5' 6\")     Wt Readings from Last 5 Encounters:   07/30/25 76.8 kg (169 lb 4.8 oz)   04/22/25 76.7 kg (169 lb)   01/23/25 75.4 kg (166 lb 3.2 oz)   01/21/25 76.7 kg (169 lb)   01/02/25 72.1 kg (159 lb)     Body mass index is 27.33 kg/m².   General: Well-developed well-nourished in no acute distress  HEENT: Normocephalic atraumatic  Neck: Supple, JVP is normal negative hepatojugular reflux 2+ carotid pulses without bruit  Pulmonary: Normal respiratory effort, clear to auscultation  Cardiovascular: No right ventricular heave, normal S1 and S2, no murmurs rubs or gallops  Abdomen: Soft nontender nondistended  Extremities: Warm without edema 2+ radial pulses bilaterally   Neurologic: Alert and oriented x3  Psychiatric: Normal mood and affect     Last Labs:  CMP:  Recent Labs     01/02/25  1241 04/24/24  1212 01/25/24  1020 10/27/22  0935   *  --  139 142   K 3.3*  --  4.7 4.5   CL 99  --  105 107   CO2 24  --  28 31   ANIONGAP 11  --  11 9*   BUN 19  --  23 18   CREATININE 0.93  --  0.83 0.81   EGFR 63 >90 72  --    GLUCOSE 128*  --  107* 106*     Recent Labs     01/02/25  1241 01/25/24  1020 10/27/22  0935   ALBUMIN 3.8 4.0 3.8   ALKPHOS 60 62 64   ALT 13 13 11   AST 16 18 15   BILITOT 0.6 0.6 0.7     CBC:  Recent Labs     01/02/25  1241 01/25/24  1020 10/27/22  0935   WBC 9.9 7.3 6.8   HGB 13.5 13.9 13.1   HCT 40.9 44.0 40.7    224 204   MCV 91 98 95     COAG:   Recent Labs     04/23/24  1215   DDIMERVTE 1,318*     HEME/ENDO:  Recent Labs     01/25/24  1020 10/27/22  0935 04/01/21  0912   TSH 2.77 2.10 3.25      CARDIAC:   Recent Labs     01/02/25  1241 04/23/24  1215   TROPHS 12  --    BNP  --  71     Recent Labs     07/22/25  1112 05/01/25  0917 01/27/25  1114 " 01/25/24  1020 10/27/22  0935 04/01/21  0912 06/19/19  0846   CHOL 129 140 148   < > 118 141 148   LDLF  --   --   --   --  53 57 61   LDLCALC 55 64 69   < >  --   --   --    HDL 54 58 59   < > 44.7 50.6 57.5   TRIG 123 99 116   < > 100 168* 148    < > = values in this interval not displayed.       Last Cardiology Tests:  ECG:  An electrocardiogram performed January 2, 2025 that I reviewed shows sinus rhythm with nonspecific ST abnormality.    Echo:  Echocardiogram November 3, 2022  CONCLUSIONS:   1. Left ventricular systolic function is normal with a 60-65% estimated ejection fraction.   2. Spectral Doppler shows an impaired relaxation pattern of left ventricular diastolic filling.       Cath:      Stress Test:  Exercise nuclear stress test November 3, 2022  The patient exercised to stage II on a Half Sonido protocol for 9 minutes and 00 seconds, achieving 7 METS. Patient received a total of 32.0 mCi of Myoview at 1:55:00 PM. The peak heart rate achieved was 133 bpm, which was 94 % of the age predicted target heart rate of 142 bpm.   Summary:  1. No clinical or electrocardiographic evidence for ischemia at a maximal workload.  2. Nuclear image results are reported separately.  3. The adequate level of stress was achieved.   IMPRESSION:  1.  Normal myocardial perfusion study without evidence of ischemia or  prior infarction.  2. The left ventricle is normal in size.  3. Normal LV wall motion with an LV EF estimated at greater than 65%.      Cardiac Imaging:  CT abdomen and pelvis January 2, 2025  Vascular: Aorta and common iliac arteries normal in caliber.     CT angio for PE April 2024  IMPRESSION:  No CT evidence of pulmonary embolism in the current exam.      Mild cardiomegaly.      Small hiatal hernia.          Assessment/Plan   Diagnoses and all orders for this visit:  Coronary artery disease involving native coronary artery of native heart without angina pectoris  Pure hypercholesterolemia  Primary  hypertension  -     Basic metabolic panel; Future  History of myocardial infarction      In summary Ms. Price is a pleasant 80 year-old white female with a past medical history significant for coronary artery disease status post PCI in the setting of a myocardial infarction in 2004, hypertension, and hyperlipidemia.  She notes improvement in her prior dyspnea since stopping metoprolol.  She is euvolemic on exam.  Her blood pressure and lipids are at goal.  I encouraged her to increase her physical activity.  I ordered a repeat BMP given her low potassium in January.  She should continue her current cardiovascular medications.  We will see her back in follow-up in 6 months.      Orders:  Orders Placed This Encounter   Procedures    Basic metabolic panel      Followup Appts:  Future Appointments   Date Time Provider Department Center   8/5/2025 11:45 AM Darryl Stout MD [id number] [place]   1/26/2026 11:30 AM MATTHEW Singh-CNP [id number] [facility]           ____________________________________________________________  Garrett Posadas MD  [facility]  [facility]

## 2025-07-31 LAB
ANION GAP SERPL CALCULATED.4IONS-SCNC: 12 MMOL/L (CALC) (ref 7–17)
BUN SERPL-MCNC: 17 MG/DL (ref 7–25)
BUN/CREAT SERPL: NORMAL (CALC) (ref 6–22)
CALCIUM SERPL-MCNC: 9.5 MG/DL (ref 8.6–10.4)
CHLORIDE SERPL-SCNC: 103 MMOL/L (ref 98–110)
CO2 SERPL-SCNC: 25 MMOL/L (ref 20–32)
CREAT SERPL-MCNC: 0.84 MG/DL (ref 0.6–0.95)
EGFRCR SERPLBLD CKD-EPI 2021: 70 ML/MIN/1.73M2
GLUCOSE SERPL-MCNC: 96 MG/DL (ref 65–139)
POTASSIUM SERPL-SCNC: 4.6 MMOL/L (ref 3.5–5.3)
SODIUM SERPL-SCNC: 140 MMOL/L (ref 135–146)

## 2025-08-05 ENCOUNTER — APPOINTMENT (OUTPATIENT)
Dept: PRIMARY CARE | Facility: CLINIC | Age: 80
End: 2025-08-05
Payer: MEDICARE

## 2025-08-05 VITALS
WEIGHT: 169 LBS | SYSTOLIC BLOOD PRESSURE: 128 MMHG | HEIGHT: 66 IN | DIASTOLIC BLOOD PRESSURE: 78 MMHG | BODY MASS INDEX: 27.16 KG/M2

## 2025-08-05 DIAGNOSIS — F41.9 ANXIETY: ICD-10-CM

## 2025-08-05 PROCEDURE — 1159F MED LIST DOCD IN RCRD: CPT | Performed by: STUDENT IN AN ORGANIZED HEALTH CARE EDUCATION/TRAINING PROGRAM

## 2025-08-05 PROCEDURE — G2211 COMPLEX E/M VISIT ADD ON: HCPCS | Performed by: STUDENT IN AN ORGANIZED HEALTH CARE EDUCATION/TRAINING PROGRAM

## 2025-08-05 PROCEDURE — 3074F SYST BP LT 130 MM HG: CPT | Performed by: STUDENT IN AN ORGANIZED HEALTH CARE EDUCATION/TRAINING PROGRAM

## 2025-08-05 PROCEDURE — 99213 OFFICE O/P EST LOW 20 MIN: CPT | Performed by: STUDENT IN AN ORGANIZED HEALTH CARE EDUCATION/TRAINING PROGRAM

## 2025-08-05 PROCEDURE — 3078F DIAST BP <80 MM HG: CPT | Performed by: STUDENT IN AN ORGANIZED HEALTH CARE EDUCATION/TRAINING PROGRAM

## 2025-08-05 PROCEDURE — 1160F RVW MEDS BY RX/DR IN RCRD: CPT | Performed by: STUDENT IN AN ORGANIZED HEALTH CARE EDUCATION/TRAINING PROGRAM

## 2025-08-05 RX ORDER — LORAZEPAM 1 MG/1
1 TABLET ORAL EVERY 8 HOURS PRN
Qty: 270 TABLET | Refills: 0 | Status: SHIPPED | OUTPATIENT
Start: 2025-08-05

## 2025-08-05 NOTE — PROGRESS NOTES
"Subjective   Patient ID: Jasmine Price is a 80 y.o. female who presents for Follow-up.    HPI     Presents for follow-up medication refill.  Reports has been doing well.  Denies any new questions or concerns.  Goes on walks outside when it is not too humid    Review of Systems    8 point review of systems is otherwise negative unless mentioned on HPI      Objective   Ht 1.676 m (5' 6\")   Wt 76.7 kg (169 lb)   BMI 27.28 kg/m²     Physical Exam    General: No acute distress  HEENT: EOMI  CV: Regular rate and rhythm, normal S1 and S2, no murmurs  Pulm: Clear to auscultation bilaterally, no wheezings, rales or rhonchi  Abd: Nondistended  MSK: 5/5 strength in all extremities  Skin: No rashes   Lymphatic: No lymphadenopathy      Assessment/Plan       Anxiety  -Continue lorazepam 1 mg to 3 times daily as needed, at times will take twice a day. Did discuss weaning back to twice a day which she does some days  -Discussed side effect profile states that she is not experiencing any  -OARRS reviewed, no concerning behavior  -UDS 1/2025, CSA 4/2025     Occasional wheezing at nighttime  -Discussed trial of albuterol inhaler, not overly bothersome at this time and will plan to monitor.     CAD/DLD/HTN  -Follows with cardiology   -Continue aspirin and statin  -Metoprolol was changed to amlodipine   -Stress test 11/2022 that was normal      Bladder prolapse  -Follows with gynecology     Health maintenance  -Signed DNAR-CCA paperwork, has healthcare power of  designated   -Mammogram 10/2024  -Pneumovax 2014  -Received Shingrix  -Tdap 2021   -Discussed getting RSV and Covid vaccine      RTC 3 months    This note was dictated by speech recognition. Minor errors in transcription may be present.    "

## 2025-08-06 ENCOUNTER — APPOINTMENT (OUTPATIENT)
Dept: CARDIOLOGY | Facility: HOSPITAL | Age: 80
End: 2025-08-06
Payer: MEDICARE

## 2025-11-04 ENCOUNTER — APPOINTMENT (OUTPATIENT)
Dept: PRIMARY CARE | Facility: CLINIC | Age: 80
End: 2025-11-04
Payer: MEDICARE